# Patient Record
Sex: MALE | Race: WHITE | Employment: PART TIME | URBAN - METROPOLITAN AREA
[De-identification: names, ages, dates, MRNs, and addresses within clinical notes are randomized per-mention and may not be internally consistent; named-entity substitution may affect disease eponyms.]

---

## 2017-01-19 RX ORDER — CARVEDILOL 25 MG/1
TABLET ORAL
Qty: 60 TABLET | Refills: 0 | Status: SHIPPED | OUTPATIENT
Start: 2017-01-19 | End: 2017-02-06

## 2017-01-19 RX ORDER — CARVEDILOL 25 MG/1
TABLET ORAL
Qty: 60 TABLET | Refills: 0 | Status: SHIPPED | OUTPATIENT
Start: 2017-01-19 | End: 2017-02-12

## 2017-01-19 NOTE — TELEPHONE ENCOUNTER
Patient made aware that script refilled.    Patient aware that he must follow up with MD, stated he has appointment set in February

## 2017-01-19 NOTE — TELEPHONE ENCOUNTER
Hypertensive Medications  Protocol Criteria:  · Appointment scheduled in the past 6 months or in the next 3 months  · BMP or CMP in the past 12 months  · Creatinine result < 2  Recent Visits       Provider Department Primary Dx    10 months ago Mao Ramirez

## 2017-01-19 NOTE — TELEPHONE ENCOUNTER
Hypertensive Medications  Protocol Criteria:  · Appointment scheduled in the past 6 months or in the next 3 months  · BMP or CMP in the past 12 months  · Creatinine result < 2  Recent Visits       Provider Department Primary Dx    10 months ago Surinder Hector

## 2017-01-21 NOTE — TELEPHONE ENCOUNTER
Spoke with pt, he called to schedule an apt and was told his insurance changed and he was scheduled to see Dr. Vannessa VILLATORO.

## 2017-02-06 ENCOUNTER — OFFICE VISIT (OUTPATIENT)
Dept: INTERNAL MEDICINE CLINIC | Facility: CLINIC | Age: 62
End: 2017-02-06

## 2017-02-06 VITALS
DIASTOLIC BLOOD PRESSURE: 84 MMHG | HEIGHT: 70.5 IN | BODY MASS INDEX: 30.72 KG/M2 | HEART RATE: 72 BPM | WEIGHT: 217 LBS | TEMPERATURE: 98 F | SYSTOLIC BLOOD PRESSURE: 149 MMHG

## 2017-02-06 DIAGNOSIS — L97.503: Primary | ICD-10-CM

## 2017-02-06 PROCEDURE — 99213 OFFICE O/P EST LOW 20 MIN: CPT | Performed by: INTERNAL MEDICINE

## 2017-02-06 PROCEDURE — 99212 OFFICE O/P EST SF 10 MIN: CPT | Performed by: INTERNAL MEDICINE

## 2017-02-06 RX ORDER — CARVEDILOL 25 MG/1
TABLET ORAL
Qty: 60 TABLET | Refills: 3 | Status: SHIPPED | OUTPATIENT
Start: 2017-02-06 | End: 2017-02-06

## 2017-02-06 RX ORDER — CARVEDILOL 25 MG/1
TABLET ORAL
Qty: 60 TABLET | Refills: 3 | Status: SHIPPED | OUTPATIENT
Start: 2017-02-06 | End: 2017-02-16

## 2017-02-07 NOTE — PROGRESS NOTES
3 months of an ulcer in his left lateral heel  Started when he took a divot out of his foot with a pumice stone  Very painful   02/06/17  1835   BP: 149/84   Pulse: 72   Temp: 98.1 °F (36.7 °C)   Height: 5' 10.5\" (1.791 m)   Weight: 217 lb (98.431 kg)

## 2017-02-13 ENCOUNTER — HOSPITAL ENCOUNTER (OUTPATIENT)
Dept: GENERAL RADIOLOGY | Facility: HOSPITAL | Age: 62
Discharge: HOME | End: 2017-02-13
Attending: INTERNAL MEDICINE
Payer: MEDICAID

## 2017-02-13 DIAGNOSIS — L97.503: ICD-10-CM

## 2017-02-13 PROCEDURE — 73650 X-RAY EXAM OF HEEL: CPT

## 2017-02-14 ENCOUNTER — OFFICE VISIT (OUTPATIENT)
Dept: PODIATRY CLINIC | Facility: CLINIC | Age: 62
End: 2017-02-14

## 2017-02-14 DIAGNOSIS — L97.521 FOOT ULCERATION, LEFT, LIMITED TO BREAKDOWN OF SKIN (HCC): Primary | ICD-10-CM

## 2017-02-14 PROCEDURE — 99212 OFFICE O/P EST SF 10 MIN: CPT | Performed by: PODIATRIST

## 2017-02-14 PROCEDURE — 99243 OFF/OP CNSLTJ NEW/EST LOW 30: CPT | Performed by: PODIATRIST

## 2017-02-14 PROCEDURE — 97597 DBRDMT OPN WND 1ST 20 CM/<: CPT | Performed by: PODIATRIST

## 2017-02-14 RX ORDER — CLINDAMYCIN HYDROCHLORIDE 150 MG/1
150 CAPSULE ORAL 3 TIMES DAILY
Qty: 30 CAPSULE | Refills: 1 | Status: SHIPPED | OUTPATIENT
Start: 2017-02-14 | End: 2017-07-15 | Stop reason: ALTCHOICE

## 2017-02-14 NOTE — PROGRESS NOTES
HPI:    Patient ID: Bercathleen Colbert is a 57year old male. HPI  This pleasant 57-year-old male presents as a new patient to me on consult from [facility]. Primary concern is wound on the lateral aspect of the left heel.   Patient states that it devel per day. As a precaution I placed this patient on clindamycin 150 mg 3 times per day. I reviewed my expectations cautioned him about infection. I plan to reevaluate this patient in 1 week.          ASSESSMENT/PLAN:   Foot ulceration, left, limited to evaristo

## 2017-02-16 RX ORDER — CARVEDILOL 25 MG/1
TABLET ORAL
Qty: 180 TABLET | Refills: 0 | Status: SHIPPED | OUTPATIENT
Start: 2017-02-16 | End: 2017-03-08

## 2017-02-16 NOTE — TELEPHONE ENCOUNTER
Hypertensive Medications  Protocol Criteria:  · Appointment scheduled in the past 6 months or in the next 3 months  · BMP or CMP in the past 12 months  · Creatinine result < 2  Recent Visits       Provider Department Primary Dx    1 week ago Raven Garvey

## 2017-02-16 NOTE — TELEPHONE ENCOUNTER
From: Nathan Chadwick  To:  Lena Lorenzana MD  Sent: 2/15/2017 4:40 AM CST  Subject: Medication Renewal Request    Original authorizing provider: MD Nathan Moralez would like a refill of the following medications:  carvedilol 2

## 2017-02-17 RX ORDER — CARVEDILOL 25 MG/1
TABLET ORAL
Qty: 60 TABLET | Refills: 0 | Status: SHIPPED | OUTPATIENT
Start: 2017-02-17 | End: 2017-03-16

## 2017-02-17 NOTE — TELEPHONE ENCOUNTER
LOV 2/6/2017. Informed pt to have labs done. Verbalized understanding.         Hypertensive Medications  Protocol Criteria:  · Appointment scheduled in the past 6 months or in the next 3 months  · BMP or CMP in the past 12 months  · Creatinine result < 2  R

## 2017-02-24 ENCOUNTER — APPOINTMENT (OUTPATIENT)
Dept: LAB | Age: 62
End: 2017-02-24
Attending: FAMILY MEDICINE
Payer: MEDICAID

## 2017-02-24 ENCOUNTER — OFFICE VISIT (OUTPATIENT)
Dept: PODIATRY CLINIC | Facility: CLINIC | Age: 62
End: 2017-02-24

## 2017-02-24 DIAGNOSIS — L97.521 FOOT ULCERATION, LEFT, LIMITED TO BREAKDOWN OF SKIN (HCC): Primary | ICD-10-CM

## 2017-02-24 PROCEDURE — 84443 ASSAY THYROID STIM HORMONE: CPT | Performed by: FAMILY MEDICINE

## 2017-02-24 PROCEDURE — 97597 DBRDMT OPN WND 1ST 20 CM/<: CPT | Performed by: PODIATRIST

## 2017-02-24 PROCEDURE — 80053 COMPREHEN METABOLIC PANEL: CPT | Performed by: FAMILY MEDICINE

## 2017-02-24 PROCEDURE — 80061 LIPID PANEL: CPT | Performed by: FAMILY MEDICINE

## 2017-02-24 NOTE — PROGRESS NOTES
HPI:    Patient ID: Rochelle Garcia is a 57year old male. HPI  69-year-old male presents for follow-up in reference to the ulceration on the posterior lateral aspect of the toe.   Patient is aware that it is present and believes that it is responding

## 2017-02-27 ENCOUNTER — TELEPHONE (OUTPATIENT)
Dept: FAMILY MEDICINE CLINIC | Facility: CLINIC | Age: 62
End: 2017-02-27

## 2017-02-27 NOTE — TELEPHONE ENCOUNTER
----- Message from Jose Guadalupe Schneider MD sent at 2/24/2017  6:31 PM CST -----  Patient has to follow up  I have not seen him since 3/2016

## 2017-02-27 NOTE — TELEPHONE ENCOUNTER
called patient. No answer left message to return call. IZABELA please schedule patient for follow up appt with Dr. Jonathan Chávez. At MD's request.  See message below.

## 2017-03-08 ENCOUNTER — OFFICE VISIT (OUTPATIENT)
Dept: FAMILY MEDICINE CLINIC | Facility: CLINIC | Age: 62
End: 2017-03-08

## 2017-03-08 ENCOUNTER — OFFICE VISIT (OUTPATIENT)
Dept: PODIATRY CLINIC | Facility: CLINIC | Age: 62
End: 2017-03-08

## 2017-03-08 VITALS
TEMPERATURE: 98 F | WEIGHT: 215 LBS | HEIGHT: 70.5 IN | HEART RATE: 76 BPM | SYSTOLIC BLOOD PRESSURE: 149 MMHG | BODY MASS INDEX: 30.44 KG/M2 | DIASTOLIC BLOOD PRESSURE: 76 MMHG

## 2017-03-08 DIAGNOSIS — E78.00 HYPERCHOLESTEREMIA: ICD-10-CM

## 2017-03-08 DIAGNOSIS — L97.421 ULCER OF LEFT HEEL, LIMITED TO BREAKDOWN OF SKIN (HCC): ICD-10-CM

## 2017-03-08 DIAGNOSIS — I10 ESSENTIAL HYPERTENSION: Primary | ICD-10-CM

## 2017-03-08 DIAGNOSIS — Z12.11 COLON CANCER SCREENING: ICD-10-CM

## 2017-03-08 DIAGNOSIS — K22.9 ABNORMALITY OF ESOPHAGUS: ICD-10-CM

## 2017-03-08 DIAGNOSIS — F17.200 SMOKER: ICD-10-CM

## 2017-03-08 DIAGNOSIS — L97.521 FOOT ULCERATION, LEFT, LIMITED TO BREAKDOWN OF SKIN (HCC): Primary | ICD-10-CM

## 2017-03-08 DIAGNOSIS — R93.89 ABNORMAL CT OF THE CHEST: ICD-10-CM

## 2017-03-08 DIAGNOSIS — G57.02 COMMON PERONEAL NEUROPATHY, LEFT: ICD-10-CM

## 2017-03-08 DIAGNOSIS — R91.1 NODULE OF RIGHT LUNG: ICD-10-CM

## 2017-03-08 PROBLEM — G57.30 COMMON PERONEAL NEUROPATHY: Status: ACTIVE | Noted: 2017-03-08

## 2017-03-08 PROBLEM — L97.429 ULCER OF LEFT HEEL (HCC): Status: ACTIVE | Noted: 2017-03-08

## 2017-03-08 PROCEDURE — 99214 OFFICE O/P EST MOD 30 MIN: CPT | Performed by: FAMILY MEDICINE

## 2017-03-08 PROCEDURE — 99212 OFFICE O/P EST SF 10 MIN: CPT | Performed by: FAMILY MEDICINE

## 2017-03-08 PROCEDURE — 99212 OFFICE O/P EST SF 10 MIN: CPT | Performed by: PODIATRIST

## 2017-03-08 RX ORDER — ATORVASTATIN CALCIUM 20 MG/1
20 TABLET, FILM COATED ORAL NIGHTLY
Qty: 90 TABLET | Refills: 0 | Status: SHIPPED | OUTPATIENT
Start: 2017-03-08 | End: 2017-04-29

## 2017-03-08 RX ORDER — LISINOPRIL 5 MG/1
5 TABLET ORAL DAILY
Qty: 30 TABLET | Refills: 0 | Status: SHIPPED | OUTPATIENT
Start: 2017-03-08 | End: 2017-03-08

## 2017-03-08 NOTE — PROGRESS NOTES
HPI:    Patient ID: Katrin Rueda is a 57year old male. HPI  This 69-year-old male presents for follow-up care in reference to the wound on the posterolateral aspect of the left heel.   Patient bumped it a couple of days ago and it has been rather t

## 2017-03-08 NOTE — PROGRESS NOTES
HPI:    Patient ID: David Fry is a 57year old male. Foot Pain   Associated symptoms include numbness. Blood Pressure  Associated symptoms include numbness.  Pertinent negatives include no abdominal pain, chest pain, chills, coughing, nausea, v (three) times daily. Disp: 30 capsule Rfl: 1     Allergies:No Known Allergies   PHYSICAL EXAM:   Physical Exam   Constitutional: He appears well-developed and well-nourished. Cardiovascular: Normal rate, regular rhythm and normal heart sounds.     Pulmona total) by mouth nightly. lisinopril 5 MG Oral Tab 30 tablet 0      Sig: Take 1 tablet (5 mg total) by mouth daily.            Imaging & Referrals:  GASTRO - INTERNAL  CT CHEST(CONTRAST ONLY) (CPT=71260)  US ABDOMINAL AORTIC ANEURYSM SCREENING (UGH=[id number]

## 2017-03-10 RX ORDER — LISINOPRIL 5 MG/1
TABLET ORAL
Qty: 90 TABLET | Refills: 0 | Status: SHIPPED | OUTPATIENT
Start: 2017-03-10 | End: 2017-04-29

## 2017-03-21 ENCOUNTER — TELEPHONE (OUTPATIENT)
Dept: FAMILY MEDICINE CLINIC | Facility: CLINIC | Age: 62
End: 2017-03-21

## 2017-03-21 DIAGNOSIS — L97.503: Primary | ICD-10-CM

## 2017-03-21 NOTE — TELEPHONE ENCOUNTER
Pt calling to request a referral so he can see Dr. Yelitza Bauer. .. please advise when referral is ready

## 2017-03-22 ENCOUNTER — TELEPHONE (OUTPATIENT)
Dept: FAMILY MEDICINE CLINIC | Facility: CLINIC | Age: 62
End: 2017-03-22

## 2017-03-22 NOTE — TELEPHONE ENCOUNTER
[facility] Radiology requesting 3/8 lab results  Needs prior to CT scheduled for tomorrow 3/23  Fx# [phone]

## 2017-03-23 RX ORDER — CARVEDILOL 25 MG/1
TABLET ORAL
Qty: 60 TABLET | Refills: 2 | Status: SHIPPED | OUTPATIENT
Start: 2017-03-23 | End: 2017-04-29

## 2017-03-23 NOTE — TELEPHONE ENCOUNTER
Hypertensive Medications  Protocol Criteria:  · Appointment scheduled in the past 6 months or in the next 3 months  · BMP or CMP in the past 12 months  · Creatinine result < 2  Recent Visits       Provider Department Primary Dx    2 weeks ago Nakia Santiago,

## 2017-03-23 NOTE — TELEPHONE ENCOUNTER
[place] radiology following up on if pt has recent CT done previously scan please advised and fax results from CT scan over so they can compare. ..

## 2017-03-23 NOTE — TELEPHONE ENCOUNTER
LMTCB. May transfer to [id number] until 4:00 today, and [id number] any time. Need clarification if they need both the labs from 2/24 and also 1/28/2015 Chest CT report which is the last completed. Messages below are not clear.

## 2017-03-24 NOTE — TELEPHONE ENCOUNTER
CT scan of chest result from 5/2/15 faxed to [facility] Radiology to number listed as below.

## 2017-03-29 ENCOUNTER — OFFICE VISIT (OUTPATIENT)
Dept: PODIATRY CLINIC | Facility: CLINIC | Age: 62
End: 2017-03-29

## 2017-03-29 DIAGNOSIS — L97.429 ULCER OF HEEL, LEFT, WITH UNSPECIFIED SEVERITY (HCC): Primary | ICD-10-CM

## 2017-03-29 PROCEDURE — 99212 OFFICE O/P EST SF 10 MIN: CPT | Performed by: PODIATRIST

## 2017-03-29 RX ORDER — ACETAMINOPHEN AND CODEINE PHOSPHATE 300; 30 MG/1; MG/1
TABLET ORAL
Qty: 20 TABLET | Refills: 0 | Status: SHIPPED | OUTPATIENT
Start: 2017-03-29 | End: 2017-04-19

## 2017-03-29 NOTE — PROGRESS NOTES
HPI:    Patient ID: Otilio Nettles is a 57year old male. HPI  This 69-year-old male presents for follow-up in reference to the wound on the posterior lateral aspect of the left heel. Last time I saw this patient was 6 weeks ago.   He is rather on re YO#[id number]

## 2017-04-11 ENCOUNTER — APPOINTMENT (OUTPATIENT)
Dept: WOUND CARE | Facility: HOSPITAL | Age: 62
End: 2017-04-11
Attending: NURSE PRACTITIONER
Payer: MEDICAID

## 2017-04-11 DIAGNOSIS — L97.422 ULCER OF LEFT HEEL, WITH FAT LAYER EXPOSED (HCC): ICD-10-CM

## 2017-04-11 DIAGNOSIS — L97.421 ULCER OF LEFT HEEL, LIMITED TO BREAKDOWN OF SKIN (HCC): Primary | ICD-10-CM

## 2017-04-11 PROCEDURE — 99213 OFFICE O/P EST LOW 20 MIN: CPT

## 2017-04-11 PROCEDURE — 99213 OFFICE O/P EST LOW 20 MIN: CPT | Performed by: CLINICAL NURSE SPECIALIST

## 2017-04-12 ENCOUNTER — HOSPITAL ENCOUNTER (OUTPATIENT)
Dept: ULTRASOUND IMAGING | Facility: HOSPITAL | Age: 62
Discharge: HOME | End: 2017-04-12
Attending: FAMILY MEDICINE
Payer: MEDICAID

## 2017-04-12 ENCOUNTER — TELEPHONE (OUTPATIENT)
Dept: FAMILY MEDICINE CLINIC | Facility: CLINIC | Age: 62
End: 2017-04-12

## 2017-04-12 DIAGNOSIS — E78.00 HYPERCHOLESTEREMIA: ICD-10-CM

## 2017-04-12 DIAGNOSIS — F17.200 SMOKER: ICD-10-CM

## 2017-04-12 DIAGNOSIS — L97.421 ULCER OF LEFT HEEL, LIMITED TO BREAKDOWN OF SKIN (HCC): ICD-10-CM

## 2017-04-12 PROCEDURE — 93923 UPR/LXTR ART STDY 3+ LVLS: CPT

## 2017-04-12 RX ORDER — LIDOCAINE AND PRILOCAINE 25; 25 MG/G; MG/G
1 CREAM TOPICAL 2 TIMES DAILY PRN
Qty: 5 G | Refills: 0 | Status: SHIPPED | OUTPATIENT
Start: 2017-04-12 | End: 2017-10-12 | Stop reason: ALTCHOICE

## 2017-04-14 NOTE — PROGRESS NOTES
Subjective    Chief Complaint  This information was obtained from the patient  The patient is new to the [address] [facility],[address] here for an initial visit for the evaluation and management of non-healing wound(s). Left heel ulcer x 6 months. Wound is not healing.   Pa PHQ2 Depression Screen scale: 0=not at all, 1=several days, 2=more than half the days, 3=nearly every day: 0  Little interest or pleasure in doing things (over the last 2 weeks)?: 0  Feeling down, depressed, or hopeless (over the last 2 weeks)?: 00  Total Wound #1 Left, Lateral Heel is an acute Full Thickness Trauma Wound and has received a status of Not Healed. Initial wound encounter measurements are 0.5cm length x 1.6cm width x 0.3cm depth, with an area of 0.8 sq cm and a volume of 0.24 cubic cm.  No tunn Plan:  Patient to follow up in outpatient wound clinic after studies have been completed in 1 week. Plan    Additional Orders: Follow-Up Appointments  Return Appointment in 1 week.     Wound Cleansing & Dressings  Clean wound with Normal Saline or

## 2017-04-18 ENCOUNTER — NURSE ONLY (OUTPATIENT)
Dept: WOUND CARE | Facility: HOSPITAL | Age: 62
End: 2017-04-18
Attending: NURSE PRACTITIONER
Payer: MEDICAID

## 2017-04-18 DIAGNOSIS — L97.429 ULCER OF LEFT HEEL, WITH UNSPECIFIED SEVERITY (HCC): Primary | ICD-10-CM

## 2017-04-18 PROCEDURE — 99213 OFFICE O/P EST LOW 20 MIN: CPT

## 2017-04-18 NOTE — PROGRESS NOTES
Subjective    Chief Complaint  This information was obtained from the patient  The patient is new to the [address] [facility],[address] here for an initial visit for the evaluation and management of non-healing wound(s). Left heel ulcer x 6 months. Wound is not healing.   Pa The periwound skin texture is normal. The periwound skin moisture is normal. The periwound skin color is normal. The temperature of the periwound skin is WNL. Periwound skin does not exhibit signs or symptoms of infection.         Assessment      Patient st

## 2017-04-19 RX ORDER — ACETAMINOPHEN AND CODEINE PHOSPHATE 300; 30 MG/1; MG/1
TABLET ORAL
Qty: 20 TABLET | Refills: 0 | OUTPATIENT
Start: 2017-04-19 | End: 2017-04-29

## 2017-04-19 NOTE — TELEPHONE ENCOUNTER
Spoke to pt and informed him that T#3 was approved for refill. Verified pharmacy with pt. Pt verbalized understanding.

## 2017-04-19 NOTE — TELEPHONE ENCOUNTER
Spoke to pt and he is requesting T#3. Rates pain 8/10. Pain interferes with sleep. States he has one tablet left of T#3. Taking ibuprofen for pain as well. States he is having vascular surgery and having a stent put in by Dr. Tiffany Landaverde.  Still going to wound cli

## 2017-04-25 ENCOUNTER — HOSPITAL ENCOUNTER (OUTPATIENT)
Dept: ULTRASOUND IMAGING | Facility: HOSPITAL | Age: 62
Discharge: HOME | End: 2017-04-25
Attending: RADIOLOGY
Payer: MEDICAID

## 2017-04-25 ENCOUNTER — NURSE ONLY (OUTPATIENT)
Dept: WOUND CARE | Facility: HOSPITAL | Age: 62
End: 2017-04-25
Attending: NURSE PRACTITIONER
Payer: MEDICAID

## 2017-04-25 ENCOUNTER — TELEPHONE (OUTPATIENT)
Dept: FAMILY MEDICINE CLINIC | Facility: CLINIC | Age: 62
End: 2017-04-25

## 2017-04-25 DIAGNOSIS — R09.89 BRUIT: ICD-10-CM

## 2017-04-25 DIAGNOSIS — L97.429 ULCER OF LEFT HEEL, WITH UNSPECIFIED SEVERITY (HCC): Primary | ICD-10-CM

## 2017-04-25 PROCEDURE — 93880 EXTRACRANIAL BILAT STUDY: CPT

## 2017-04-25 PROCEDURE — 99212 OFFICE O/P EST SF 10 MIN: CPT

## 2017-04-25 NOTE — PROGRESS NOTES
Subjective    Chief Complaint  This information was obtained from the patient  The patient was seen today for follow up and management of difficult to heal wound(s). No concerns.     Allergies  NKA    HPI  This information was obtained from the patient  Sheila Yeung Wound noted with increasing red granuation tissue compared to his last clinic visit. Patient is complaining of a burning sensation from the wound, noted with mild redness and appears inflamed. SALVADOR Hilario saw the patient. No new orders made.  Patient is sc

## 2017-04-25 NOTE — TELEPHONE ENCOUNTER
Carotid us report reviewed  Patient referred to vascular surgery. Continue aspirin 325mg daily.     Patient was called by Leobardo Parnell and informed

## 2017-04-25 NOTE — TELEPHONE ENCOUNTER
Received a call from [facility]. AMA informed of abnormal US. Per AMA pt informed of results and given information for Dr. Elham Barbosa Vascular Surgeon. Advised pt of AMA recommendation to start taking Aspirin 325 mg daily.  Pt. stts he has been ta

## 2017-04-29 PROBLEM — I65.23 BILATERAL CAROTID ARTERY STENOSIS: Status: ACTIVE | Noted: 2017-04-29

## 2017-04-29 NOTE — PROGRESS NOTES
Katrin Rueda is a 57year old male. HPI:   Patient presents for recheck of his hypertension. Pt has been taking medications as instructed, no medication side effects,    Had recent carotid artery US ordered by Dr Lena Maldonado. CONCLUSION:       1.  Hi 180 tablet Rfl: 1   lidocaine-prilocaine 2.5-2.5 % External Cream Apply 1 Application topically 2 (two) times daily as needed. Disp: 5 g Rfl: 0   Clindamycin HCl 150 MG Oral Cap Take 1 capsule (150 mg total) by mouth 3 (three) times daily.  Disp: 30 capsule months    1. Smoker  Patient working on quitting  Plans to quit [birth year] for his birthday    Need CT imaging results from an outside facility    2. Essential hypertension  Stable  cpm    3.  Bilateral carotid artery stenosis  To follow up with Dr Kavita Benítez, needs

## 2017-04-29 NOTE — PATIENT INSTRUCTIONS
Carotid Artery Problems: Blockage     A healthy carotid artery lets blood flow easily to the brain. The blood carries oxygen and nutrients throughout the body. The carotid arteries are large blood vessels that carry blood to the brain.  Certain health p A skin incision is made over the carotid artery. Endarterectomy is the removal of plaque from the carotid artery through an incision in the neck. This surgery has a low risk of stroke or complication (1% to 3%).  It typically involves a quick recovery w © 8606-6443 [address], [address] [place]. All rights reserved. This information is not intended as a substitute for professional medical care. Always follow your healthcare professional's instructions.

## 2017-05-03 ENCOUNTER — NURSE ONLY (OUTPATIENT)
Dept: WOUND CARE | Facility: HOSPITAL | Age: 62
End: 2017-05-03
Attending: NURSE PRACTITIONER
Payer: MEDICAID

## 2017-05-03 ENCOUNTER — HOSPITAL ENCOUNTER (OUTPATIENT)
Dept: CT IMAGING | Facility: HOSPITAL | Age: 62
Discharge: HOME | End: 2017-05-03
Attending: RADIOLOGY
Payer: MEDICAID

## 2017-05-03 DIAGNOSIS — L97.429 ULCER OF LEFT HEEL, WITH UNSPECIFIED SEVERITY (HCC): Primary | ICD-10-CM

## 2017-05-03 DIAGNOSIS — I73.9 PAD (PERIPHERAL ARTERY DISEASE) (HCC): ICD-10-CM

## 2017-05-03 PROCEDURE — 73706 CT ANGIO LWR EXTR W/O&W/DYE: CPT

## 2017-05-03 PROCEDURE — 99213 OFFICE O/P EST LOW 20 MIN: CPT

## 2017-05-03 PROCEDURE — 82565 ASSAY OF CREATININE: CPT

## 2017-05-03 NOTE — PROGRESS NOTES
Chief Complaint  This information was obtained from the patient  The patient was seen today for follow up and management of difficult to heal wound(s). No concerns.     General Notes:  Pt has pain when walking ih the shoe  Allergies  NKA    HPI  This inform The patient was asking for \"numbing cream\" prior to any debridment. The wound still have slough in the base. The wound was debrided by Mary FRANCO. This was not significant enough to charge debridment.  Patient was complaining about pain due to the w patient did not wear his rearfoot offloading shoe. he was wearing his crocs           Debridement Details   Patient Name: Tierra Law  Date: 5/3/2017     RN: Syeda Jerome      Physician / Elham Mallory: Janet Castillo   Patient Number: [id number] Facility:

## 2017-05-10 ENCOUNTER — NURSE ONLY (OUTPATIENT)
Dept: WOUND CARE | Facility: HOSPITAL | Age: 62
End: 2017-05-10
Attending: NURSE PRACTITIONER
Payer: MEDICAID

## 2017-05-10 DIAGNOSIS — L97.429 ULCER OF LEFT HEEL, WITH UNSPECIFIED SEVERITY (HCC): Primary | ICD-10-CM

## 2017-05-10 PROCEDURE — 99213 OFFICE O/P EST LOW 20 MIN: CPT

## 2017-05-10 NOTE — PROGRESS NOTES
Chief Complaint  This information was obtained from the patient  The patient was seen today for follow up and management of difficult to heal wound(s). No concerns.     Allergies  NKA    HPI  This information was obtained from the patient  Patient is a 57 y width x 0.2cm depth, with an area of 1.8 sq cm and a volume of 0.36 cubic cm. There is a small amount of yellow drainage noted which has a mild odor. The patient reports a wound pain of level 6/10. The wound margin is thickened.  Wound bed is 51-75% slough, 7 cm in length. -Popliteal artery proximal segment occlusion, contiguous with distal SFA occlusion. Reconstitution via collateral supply with serial moderate to severe stenoses. -Patent anterior and posterior tibial arteries.  Weakly opacified distal lizeth

## 2017-05-13 NOTE — PROGRESS NOTES
Quick Note:    Called patient c result.  Referral to Dr. Sana Nieto, CV surgery, re: carotid endarterectomy  ______

## 2017-05-13 NOTE — PROGRESS NOTES
Quick Note:    Chronic long segment L SFA and popliteal occlusion. Because of nonhealing L heel ulceration, patient will require revascularization via PTA/stent or surgical bypass. Called patient with result, discussed treatment plan.  Heel ulcer is cli

## 2017-05-16 RX ORDER — HYDROCODONE BITARTRATE AND ACETAMINOPHEN 5; 325 MG/1; MG/1
1 TABLET ORAL EVERY 6 HOURS PRN
Qty: 20 TABLET | Refills: 0 | Status: CANCELLED | OUTPATIENT
Start: 2017-05-16

## 2017-05-16 NOTE — TELEPHONE ENCOUNTER
From: Magnolia Dotson  To: Jamilah Richardson MD  Sent: 5/9/2017 7:22 PM CDT  Subject: Medication Renewal Request    Original authorizing provider: MD Magnolia Kruger would like a refill of the following medications:  HYDROcodone-acetaminop

## 2017-05-16 NOTE — TELEPHONE ENCOUNTER
Called patient  He is very nervous  Pacing room. Happens only at night  Worried about finances. Has cut back on smoking  Would like some alprazolam  Recommend follow up  Would like sat appointment. Next sat in 5/27  pls call to schedule patient.   pedro pablo palmer

## 2017-05-17 ENCOUNTER — NURSE ONLY (OUTPATIENT)
Dept: WOUND CARE | Facility: HOSPITAL | Age: 62
End: 2017-05-17
Attending: NURSE PRACTITIONER
Payer: MEDICAID

## 2017-05-17 DIAGNOSIS — L97.429 ULCER OF LEFT HEEL, WITH UNSPECIFIED SEVERITY (HCC): Primary | ICD-10-CM

## 2017-05-17 PROCEDURE — 97602 WOUND(S) CARE NON-SELECTIVE: CPT

## 2017-05-17 NOTE — PROGRESS NOTES
Chief Complaint  This information was obtained from the patient  The patient was seen today for follow up and management of difficult to heal wound(s). No concerns.     Allergies  NKA    HPI  This information was obtained from the patient  Patient is a 57 y Wound #1 Left, Lateral Heel is an acute Full Thickness Trauma Wound and has received a status of Not Healed. Subsequent wound encounter measurements are 1.2cm length x 1.4cm width x 0.1cm depth, with an area of 1.68 sq cm and a volume of 0.168 cubic cm.  Sandra Gilliland .Wound Treatment Note  Limb cleansed using Soap and water (1)   Cleansed wound and periwound with non-cytotoxic agent.  using Wound Cleanser Spray (1)   Applied enzymatic agent to base of wound bed. using Collagenase Santyl (1)   Applied Primary Wound Dress

## 2017-05-18 NOTE — TELEPHONE ENCOUNTER
Medication refilled  Picked up by patient today at [address] [facility],  [address]

## 2017-05-20 RX ORDER — HYDROCODONE BITARTRATE AND ACETAMINOPHEN 5; 325 MG/1; MG/1
1 TABLET ORAL EVERY 6 HOURS PRN
Qty: 20 TABLET | Refills: 0 | OUTPATIENT
Start: 2017-05-20

## 2017-05-26 ENCOUNTER — NURSE ONLY (OUTPATIENT)
Dept: WOUND CARE | Facility: HOSPITAL | Age: 62
End: 2017-05-26
Attending: NURSE PRACTITIONER
Payer: MEDICAID

## 2017-05-26 DIAGNOSIS — L97.429 ULCER OF LEFT HEEL, WITH UNSPECIFIED SEVERITY (HCC): Primary | ICD-10-CM

## 2017-05-26 PROCEDURE — 97602 WOUND(S) CARE NON-SELECTIVE: CPT

## 2017-05-26 NOTE — PROGRESS NOTES
Chief Complaint  This information was obtained from the patient  The patient was seen today for follow up and management of difficult to heal wound(s). No concerns. General Notes:  No complaints.   Allergies  NKA    HPI  This information was obtained fro wound margin is thickened. Wound bed is 1-25% slough, 1-25% bright red granulation; no epithelialization, , eschar present. The wound is improving.    The periwound skin texture is normal. The periwound skin moisture is normal. The periwound skin color is n Secondary Wound Dressing. using Gauze (sterile) 2x2 (1)   Dressing secured with non-allergenic tape/stockinet/wrap. using Medipore (1)   Offloading  Applied Offloading device.  using 1/4\" thick adhesive felt applied to periwound (2)   Applied offloading sh

## 2017-05-27 PROBLEM — L97.409 HEEL ULCER (HCC): Status: ACTIVE | Noted: 2017-05-27

## 2017-05-27 PROBLEM — F41.0 PANIC ATTACK AS REACTION TO STRESS: Status: ACTIVE | Noted: 2017-05-27

## 2017-05-27 PROBLEM — F43.0 PANIC ATTACK AS REACTION TO STRESS: Status: ACTIVE | Noted: 2017-05-27

## 2017-05-27 NOTE — PROGRESS NOTES
HPI:    Patient ID: Sukumar Renner is a 57year old male. HPI     Patient here for follow up  bps better    Patient has been very stressed about surgery, financial situation.     Lives alone  Wasn't able to work due to heel ulcer for 1 month    Plans Take 1 capsule (150 mg total) by mouth 3 (three) times daily. Disp: 30 capsule Rfl: 1     Allergies:No Known Allergies   PHYSICAL EXAM:   Physical Exam   Constitutional: He appears well-developed and well-nourished.    Cardiovascular: Normal rate, regular r

## 2017-05-31 ENCOUNTER — NURSE ONLY (OUTPATIENT)
Dept: WOUND CARE | Facility: HOSPITAL | Age: 62
End: 2017-05-31
Attending: NURSE PRACTITIONER
Payer: MEDICAID

## 2017-05-31 DIAGNOSIS — L97.429 ULCER OF LEFT HEEL, WITH UNSPECIFIED SEVERITY (HCC): Primary | ICD-10-CM

## 2017-05-31 PROCEDURE — 97602 WOUND(S) CARE NON-SELECTIVE: CPT

## 2017-05-31 NOTE — PROGRESS NOTES
Chief Complaint  This information was obtained from the patient  The patient was seen today for follow up and management of difficult to heal wound(s). No concerns.     General Notes:  no concerns for today  Allergies  NKA    HPI  This information was Noel Bermudez measurements are 1cm length x 1.7cm width x 0.2cm depth, with an area of 1.7 sq cm and a volume of 0.34 cubic cm. There is a small amount of yellow drainage noted which has a mild odor. The patient reports a wound pain of level 6/10.  The wound margin is th Heel)  . Wound Treatment Note  Limb cleansed using Soap and water (1)   Cleansed wound and periwound with non-cytotoxic agent.  using Wound Cleanser Spray (1)   Applied enzymatic agent to base of wound bed. using Collagenase Santyl (1)   Applied Primary Woun

## 2017-06-07 ENCOUNTER — NURSE ONLY (OUTPATIENT)
Dept: WOUND CARE | Facility: HOSPITAL | Age: 62
End: 2017-06-07
Attending: NURSE PRACTITIONER
Payer: MEDICAID

## 2017-06-07 DIAGNOSIS — L97.421 HEEL ULCER, LEFT, LIMITED TO BREAKDOWN OF SKIN (HCC): Primary | ICD-10-CM

## 2017-06-07 PROCEDURE — 97602 WOUND(S) CARE NON-SELECTIVE: CPT

## 2017-06-07 NOTE — PROGRESS NOTES
Chief Complaint  This information was obtained from the patient  The patient was seen today for follow up and management of difficult to heal wound(s). No concerns.     General Notes:  no concerns for today  Allergies  NKA    HPI  This information was Noel Bermudez Wound #1 Left, Lateral Heel is an acute Full Thickness Trauma Wound and has received a status of Not Healed. Subsequent wound encounter measurements are 0.5cm length x 1.9cm width x 0.2cm depth, with an area of 0.95 sq cm and a volume of 0.19 cubic cm.  The corn pad applied

## 2017-06-12 ENCOUNTER — APPOINTMENT (OUTPATIENT)
Dept: WOUND CARE | Facility: HOSPITAL | Age: 62
End: 2017-06-12
Payer: MEDICAID

## 2017-06-13 NOTE — TELEPHONE ENCOUNTER
· Last refilled on 5/17/17 #30 0RF  · Printed script needed.       Recent Visits       Provider Department Primary Dx    2 weeks ago Peng Baumann MD [facility], [address] [facility] [address] Panic attack as reaction to stress    1 month ago Peng Baumann,

## 2017-06-13 NOTE — TELEPHONE ENCOUNTER
From: Josefa Manriquez  To: Beryl Carlos MD  Sent: 6/10/2017 1:31 PM CDT  Subject: Medication Renewal Request    Original authorizing provider: MD Josefa Martni would like a refill of the following medications:  HYDROcodone-acetamino

## 2017-06-14 ENCOUNTER — NURSE ONLY (OUTPATIENT)
Dept: WOUND CARE | Facility: HOSPITAL | Age: 62
End: 2017-06-14
Attending: NURSE PRACTITIONER
Payer: MEDICAID

## 2017-06-14 DIAGNOSIS — L97.429 ULCER OF LEFT HEEL, WITH UNSPECIFIED SEVERITY (HCC): Primary | ICD-10-CM

## 2017-06-14 PROCEDURE — 97602 WOUND(S) CARE NON-SELECTIVE: CPT

## 2017-06-14 NOTE — PROGRESS NOTES
Chief Complaint  This information was obtained from the patient  The patient was seen today for follow up and management of difficult to heal wound(s).  patient has no concerns for today'    Allergies  Penicillins    HPI  This information was obtained from infection.         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

## 2017-06-17 RX ORDER — HYDROCODONE BITARTRATE AND ACETAMINOPHEN 5; 325 MG/1; MG/1
1 TABLET ORAL EVERY 8 HOURS PRN
Qty: 30 TABLET | Refills: 0 | Status: SHIPPED | OUTPATIENT
Start: 2017-06-17 | End: 2017-07-08

## 2017-06-21 ENCOUNTER — NURSE ONLY (OUTPATIENT)
Dept: WOUND CARE | Facility: HOSPITAL | Age: 62
End: 2017-06-21
Attending: NURSE PRACTITIONER
Payer: MEDICAID

## 2017-06-21 DIAGNOSIS — L97.421 HEEL ULCER, LEFT, LIMITED TO BREAKDOWN OF SKIN (HCC): Primary | ICD-10-CM

## 2017-06-21 PROCEDURE — 97602 WOUND(S) CARE NON-SELECTIVE: CPT

## 2017-06-21 RX ORDER — ALPRAZOLAM 0.5 MG/1
0.5 TABLET ORAL NIGHTLY PRN
Qty: 30 TABLET | Refills: 0 | OUTPATIENT
Start: 2017-06-21 | End: 2017-07-08

## 2017-06-21 NOTE — PROGRESS NOTES
Chief Complaint  This information was obtained from the patient  The patient was seen today for follow up and management of difficult to heal wound(s). No concerns.   6/14/17 patient complaint a pain around the wound    General Notes:  no concerns for today status of Not Healed. Subsequent wound encounter measurements are 0.5cm length x 1.6cm width x 0.1cm depth, with an area of 0.8 sq cm and a volume of 0.08 cubic cm. There is a small amount of sero-sanguineous drainage noted which has a mild odor.  The patie Applied enzymatic agent to base of wound bed. using Collagenase Santyl (1)   Applied Primary Wound Dressing. using Other - see notes (1), Xeroform 2x2 (1)   Applied Secondary Wound Dressing.  using Gauze (sterile) 2x2 (1)   Dressing secured with non-aller

## 2017-06-23 RX ORDER — ALPRAZOLAM 0.5 MG/1
0.5 TABLET ORAL NIGHTLY PRN
Qty: 30 TABLET | Refills: 0 | OUTPATIENT
Start: 2017-06-23

## 2017-06-23 NOTE — TELEPHONE ENCOUNTER
From: Baron Barboza  To: Mirela Zimmer MD  Sent: 6/21/2017 4:45 PM CDT  Subject: Medication Renewal Request    Original authorizing provider: MD Baron Jabari Oconnor would like a refill of the following medications:  ALPRAZolam 0.5 MG Ora

## 2017-06-26 RX ORDER — ALPRAZOLAM 0.5 MG/1
TABLET ORAL
Qty: 30 TABLET | Refills: 0 | OUTPATIENT
Start: 2017-06-26

## 2017-06-28 ENCOUNTER — APPOINTMENT (OUTPATIENT)
Dept: WOUND CARE | Facility: HOSPITAL | Age: 62
End: 2017-06-28
Attending: NURSE PRACTITIONER
Payer: MEDICAID

## 2017-06-28 DIAGNOSIS — L97.421 HEEL ULCER, LEFT, LIMITED TO BREAKDOWN OF SKIN (HCC): Primary | ICD-10-CM

## 2017-06-28 PROCEDURE — 97602 WOUND(S) CARE NON-SELECTIVE: CPT

## 2017-06-28 NOTE — PROGRESS NOTES
Subjective    Chief Complaint  This information was obtained from the patient  The patient was seen today for follow up and management of difficult to heal wound(s). No concerns.   6/14/17 patient complaint a pain around the wound    General Notes:  no conc Wound #1 Left, Lateral Heel is an acute Full Thickness Trauma Wound and has received a status of Not Healed. Subsequent wound encounter measurements are 0.6cm length x 1.6cm width x 0.1cm depth, with an area of 0.96 sq cm and a volume of 0.096 cubic cm.  No Follow-Up Appointments:  A follow-up appointment is scheduled next week.           Electronic Signature(s)  Signed By: Date:  Yariel Tucker RN 06/28/2017 2:59:05 PM       Entered By: Yariel Tucker on 06/28/2017 2:58:46 PM   Treatment Notes Summary  Wound

## 2017-07-05 ENCOUNTER — OFFICE VISIT (OUTPATIENT)
Dept: FAMILY MEDICINE CLINIC | Facility: CLINIC | Age: 62
End: 2017-07-05

## 2017-07-05 ENCOUNTER — NURSE ONLY (OUTPATIENT)
Dept: WOUND CARE | Facility: HOSPITAL | Age: 62
End: 2017-07-05
Attending: NURSE PRACTITIONER
Payer: COMMERCIAL

## 2017-07-05 DIAGNOSIS — L97.429 ULCER OF LEFT HEEL, WITH UNSPECIFIED SEVERITY (HCC): Primary | ICD-10-CM

## 2017-07-05 DIAGNOSIS — J32.9 SINUSITIS, UNSPECIFIED CHRONICITY, UNSPECIFIED LOCATION: Primary | ICD-10-CM

## 2017-07-05 PROCEDURE — 97602 WOUND(S) CARE NON-SELECTIVE: CPT

## 2017-07-05 PROCEDURE — 99202 OFFICE O/P NEW SF 15 MIN: CPT | Performed by: NURSE PRACTITIONER

## 2017-07-05 RX ORDER — AMOXICILLIN AND CLAVULANATE POTASSIUM 875; 125 MG/1; MG/1
1 TABLET, FILM COATED ORAL 2 TIMES DAILY
Qty: 20 TABLET | Refills: 0 | Status: SHIPPED | OUTPATIENT
Start: 2017-07-05 | End: 2017-07-15 | Stop reason: ALTCHOICE

## 2017-07-05 NOTE — PROGRESS NOTES
CHIEF COMPLAINT:   Patient presents with:  Sinus Problem      HPI:   Kait Prasad is a 60year old male who presents for sinus congestion, pressure and fullness for 2 weeks. Symptoms are progressing/worsening since onset.  He is using nasocort and muci Smokeless tobacco: Never Used                      Alcohol use: Yes              Comment: Occsionally        REVIEW OF SYSTEMS:   GENERAL:  normal appetite and energy  SKIN: no rashes or abnormal skin lesions  HEENT: See HPI.  No ocular discharge or pruriti The patient is asked to seek further care if symptoms worsen or do not improve. Meds & Refills for this Visit:  Risks, benefits, side effects of medication addressed and explained.       Signed Prescriptions Disp Refills    Amoxicillin-Pot Clavulanate 87 · Over-the-counter decongestants may be used unless a similar medicine was prescribed. Nasal sprays work the fastest. Use one that contains phenylephrine or oxymetazoline. First blow the nose gently. Then use the spray.  Do not use these medicines more ofte © 7784-6212 [address], [address] [place]. All rights reserved. This information is not intended as a substitute for professional medical care. Always follow your healthcare professional's instructions.

## 2017-07-05 NOTE — PROGRESS NOTES
Subjective    Chief Complaint  This information was obtained from the patient  The patient was seen today for follow up and management of difficult to heal wound(s). No concerns.   6/14/17 patient complaint a pain around the wound  7/5/17 no complaints for Wound #1 Left, Lateral Heel is an acute Full Thickness Trauma Wound and has received a status of Not Healed. Subsequent wound encounter measurements are 0.5cm length x 1.6cm width x 0.1cm depth, with an area of 0.8 sq cm and a volume of 0.08 cubic cm.  No t Dressing secured with non-allergenic tape/stockinet/wrap. using Medipore (1)   Offloading  Applied Offloading device.  using 1/4\" thick adhesive felt applied to periwound (2)   Notes  xeroform and saline moist gauze applied over santyl

## 2017-07-11 NOTE — TELEPHONE ENCOUNTER
From: Melba Asencio  Sent: 7/8/2017 11:58 AM CDT  Subject: Medication Renewal Request    Melba Asencio would like a refill of the following medications:  HYDROcodone-acetaminophen 5-325 MG Oral Tab [Aure Estrada MD]  ALPRAZolam 0.5 MG Oral Tab [A

## 2017-07-11 NOTE — TELEPHONE ENCOUNTER
Medication(s) to Refill:   Pending Prescriptions Disp Refills    HYDROcodone-acetaminophen 5-325 MG Oral Tab 30 tablet 0     Sig: Take 1 tablet by mouth every 8 (eight) hours as needed for Pain.       ALPRAZolam 0.5 MG Oral Tab 30 tablet 0     Sig: Take 1 t

## 2017-07-12 ENCOUNTER — NURSE ONLY (OUTPATIENT)
Dept: WOUND CARE | Facility: HOSPITAL | Age: 62
End: 2017-07-12
Attending: NURSE PRACTITIONER
Payer: COMMERCIAL

## 2017-07-12 DIAGNOSIS — L97.429 ULCER OF LEFT HEEL, WITH UNSPECIFIED SEVERITY (HCC): Primary | ICD-10-CM

## 2017-07-12 PROCEDURE — 97602 WOUND(S) CARE NON-SELECTIVE: CPT

## 2017-07-12 RX ORDER — ALPRAZOLAM 0.5 MG/1
0.5 TABLET ORAL NIGHTLY PRN
Qty: 30 TABLET | Refills: 0 | Status: CANCELLED
Start: 2017-07-12

## 2017-07-12 RX ORDER — HYDROCODONE BITARTRATE AND ACETAMINOPHEN 5; 325 MG/1; MG/1
1 TABLET ORAL EVERY 8 HOURS PRN
Qty: 30 TABLET | Refills: 0 | Status: CANCELLED
Start: 2017-07-12

## 2017-07-12 NOTE — TELEPHONE ENCOUNTER
Pt stts he would like a refill on Rx hydrocodone and Rx alprazolam. Pt will be at [facility] tomorrow and would like to pick. Please call when ready for .  Please advise         Current Outpatient Prescriptions:     •  ALPRAZolam 0.5 MG Oral Tab, Take

## 2017-07-12 NOTE — PROGRESS NOTES
Subjective    Chief Complaint  This information was obtained from the patient  The patient was seen today for follow up and management of difficult to heal wound(s). No concerns.   6/14/17 patient complaint a pain around the wound  7/5/17 no complaints for Wound #1 Left, Lateral Heel is an acute Full Thickness Trauma Wound and has received a status of Not Healed. Subsequent wound encounter measurements are 0.3cm length x 1.5cm width x 0.1cm depth, with an area of 0.45 sq cm and a volume of 0.045 cubic cm.  No

## 2017-07-13 RX ORDER — HYDROCODONE BITARTRATE AND ACETAMINOPHEN 5; 325 MG/1; MG/1
1 TABLET ORAL EVERY 8 HOURS PRN
Qty: 30 TABLET | Refills: 0 | Status: SHIPPED | OUTPATIENT
Start: 2017-07-13 | End: 2017-08-16

## 2017-07-13 RX ORDER — ALPRAZOLAM 0.5 MG/1
0.5 TABLET ORAL NIGHTLY PRN
Qty: 30 TABLET | Refills: 0 | Status: SHIPPED | OUTPATIENT
Start: 2017-07-13 | End: 2017-08-16

## 2017-07-15 ENCOUNTER — HOSPITAL ENCOUNTER (OUTPATIENT)
Dept: CT IMAGING | Facility: HOSPITAL | Age: 62
Discharge: HOME | End: 2017-07-15
Attending: PHYSICIAN ASSISTANT
Payer: COMMERCIAL

## 2017-07-15 DIAGNOSIS — I65.23 BILATERAL CAROTID ARTERY STENOSIS: ICD-10-CM

## 2017-07-15 LAB — CREAT BLD-MCNC: 0.7 MG/DL (ref 0.5–1.5)

## 2017-07-15 PROCEDURE — 70498 CT ANGIOGRAPHY NECK: CPT | Performed by: PHYSICIAN ASSISTANT

## 2017-07-15 PROCEDURE — 82565 ASSAY OF CREATININE: CPT

## 2017-07-15 NOTE — PROGRESS NOTES
HPI:    Patient ID: Julieta Lemus is a 60year old male. HPI    Patient here for follow up  Very nervous and anxious  Feels panicky    Cared about the carotid stenosis and surgery and bills  Financial stress    Still smoking  Has cut back.     Contin tablet (25 mg total) by mouth 2 (two) times daily with meals. Disp: 180 tablet Rfl: 1   lidocaine-prilocaine 2.5-2.5 % External Cream Apply 1 Application topically 2 (two) times daily as needed.  Disp: 5 g Rfl: 0     Allergies:No Known Allergies   PHYSICAL

## 2017-07-19 ENCOUNTER — APPOINTMENT (OUTPATIENT)
Dept: WOUND CARE | Facility: HOSPITAL | Age: 62
End: 2017-07-19
Attending: NURSE PRACTITIONER
Payer: COMMERCIAL

## 2017-07-19 DIAGNOSIS — L97.429 ULCER OF LEFT HEEL, WITH UNSPECIFIED SEVERITY (HCC): Primary | ICD-10-CM

## 2017-07-19 PROCEDURE — 97602 WOUND(S) CARE NON-SELECTIVE: CPT

## 2017-07-19 NOTE — PROGRESS NOTES
Subjective    Chief Complaint  This information was obtained from the patient  The patient was seen today for follow up and management of difficult to heal wound(s). No concerns.   6/14/17 patient complaint a pain around the wound  7/5/17 no complaints for Wound #1 Left, Lateral Heel is an acute Full Thickness Trauma Wound and has received a status of Not Healed. Subsequent wound encounter measurements are 0.3cm length x 1cm width x 0.1cm depth, with an area of 0.3 sq cm and a volume of 0.03 cubic cm.  No kiesha Applied enzymatic agent to base of wound bed. using Collagenase Santyl (1)   Applied Primary Wound Dressing. using Xeroform 2x2 (1)   Applied Secondary Wound Dressing.  using Gauze (sterile) 2x2 (1)   Dressing secured with non-allergenic tape/stockinet/wrap

## 2017-07-26 ENCOUNTER — TELEPHONE (OUTPATIENT)
Dept: FAMILY MEDICINE CLINIC | Facility: CLINIC | Age: 62
End: 2017-07-26

## 2017-07-26 ENCOUNTER — NURSE ONLY (OUTPATIENT)
Dept: WOUND CARE | Facility: HOSPITAL | Age: 62
End: 2017-07-26
Attending: NURSE PRACTITIONER
Payer: COMMERCIAL

## 2017-07-26 DIAGNOSIS — L97.429 ULCER OF LEFT HEEL, WITH UNSPECIFIED SEVERITY (HCC): Primary | ICD-10-CM

## 2017-07-26 PROCEDURE — 99212 OFFICE O/P EST SF 10 MIN: CPT

## 2017-07-26 NOTE — PROGRESS NOTES
Subjective    Chief Complaint  This information was obtained from the patient  The patient was seen today for follow up and management of difficult to heal wound(s). No concerns.   6/14/17 patient complaint a pain around the wound  7/26/17 no complaints for Wound #1 Left, Lateral Heel is an acute Full Thickness Trauma Wound and has received a status of Not Healed. Subsequent wound encounter measurements are 0.3cm length x 0.9cm width x 0.1cm depth, with an area of 0.27 sq cm and a volume of 0.027 cubic cm.  No Cleansed wound and periwound with non-cytotoxic agent. using Wound Cleanser Spray (1)   Applied Primary Wound Dressing.  using Hydrofera ready 2x2 (1)   Dressing secured with non-allergenic tape/stockinet/wrap. using Medipore (1)   Offloading  Applied Offlo

## 2017-07-27 NOTE — TELEPHONE ENCOUNTER
PA for Hydrocodone-Acetaminophen 5-325 mg tab completed with EPS via CMM response time 24-72 hours KEY FKGATY.

## 2017-08-02 ENCOUNTER — NURSE ONLY (OUTPATIENT)
Dept: WOUND CARE | Facility: HOSPITAL | Age: 62
End: 2017-08-02
Attending: NURSE PRACTITIONER
Payer: MEDICAID

## 2017-08-02 DIAGNOSIS — L97.429 ULCER OF LEFT HEEL, WITH UNSPECIFIED SEVERITY (HCC): Primary | ICD-10-CM

## 2017-08-02 PROCEDURE — 99212 OFFICE O/P EST SF 10 MIN: CPT

## 2017-08-02 PROCEDURE — 99211 OFF/OP EST MAY X REQ PHY/QHP: CPT | Performed by: NURSE PRACTITIONER

## 2017-08-02 NOTE — PROGRESS NOTES
Chief Complaint  This information was obtained from the patient  The patient was seen today for follow up and management of difficult to heal wound(s). No concerns.   6/14/17 patient complaint a pain around the wound  8/2/17 no complaints for today    Aller Wound #1 Left, Lateral Heel is an acute Full Thickness Trauma Wound and has received a status of Not Healed. Subsequent wound encounter measurements are 0.3cm length x 1cm width with no measurable depth, with an area of 0.3 sq cm .  There is a small amount Entered By: Jaqueline Fenton on 08/02/2017 1:53:22 PM   Treatment Notes Summary  Wound #1 (Left, Lateral Heel)    . Wound Treatment Note  Limb cleansed using Soap and water (1)  Cleansed wound and periwound with non-cytotoxic agent.  using Wound Cleanser Spray

## 2017-08-09 ENCOUNTER — NURSE ONLY (OUTPATIENT)
Dept: WOUND CARE | Facility: HOSPITAL | Age: 62
End: 2017-08-09
Attending: NURSE PRACTITIONER
Payer: MEDICAID

## 2017-08-09 DIAGNOSIS — L97.429 ULCER OF LEFT HEEL, WITH UNSPECIFIED SEVERITY (HCC): Primary | ICD-10-CM

## 2017-08-09 PROCEDURE — 99212 OFFICE O/P EST SF 10 MIN: CPT

## 2017-08-09 NOTE — PROGRESS NOTES
Chief Complaint  This information was obtained from the patient  The patient was seen today for follow up and management of difficult to heal wound(s). No concerns.   6/14/17 patient complaint a pain around the wound  8/9/17 no complaints for today    Aller Wound #1 Left, Lateral Heel is an acute Full Thickness Trauma Wound and has received a status of Not Healed. Subsequent wound encounter measurements are 0.4cm length x 0.8cm width x 0.1cm depth, with an area of 0.32 sq cm and a volume of 0.032 cubic cm.  Charlie Cardenas Slow progressing wound and due to the arterial disease. The wound has become superficial although remains painful to touch.   Patient has some financial issues regarding his insurance and payments, he is postponing the stenting of his lower extremity arteri

## 2017-08-16 ENCOUNTER — OFFICE VISIT (OUTPATIENT)
Dept: FAMILY MEDICINE CLINIC | Facility: CLINIC | Age: 62
End: 2017-08-16

## 2017-08-16 ENCOUNTER — NURSE ONLY (OUTPATIENT)
Dept: WOUND CARE | Facility: HOSPITAL | Age: 62
End: 2017-08-16
Attending: NURSE PRACTITIONER
Payer: MEDICAID

## 2017-08-16 VITALS
HEIGHT: 70.5 IN | DIASTOLIC BLOOD PRESSURE: 81 MMHG | HEART RATE: 77 BPM | TEMPERATURE: 98 F | BODY MASS INDEX: 28.6 KG/M2 | SYSTOLIC BLOOD PRESSURE: 171 MMHG | WEIGHT: 202 LBS

## 2017-08-16 DIAGNOSIS — I10 ESSENTIAL HYPERTENSION: ICD-10-CM

## 2017-08-16 DIAGNOSIS — L97.429 ULCER OF LEFT HEEL, WITH UNSPECIFIED SEVERITY (HCC): ICD-10-CM

## 2017-08-16 DIAGNOSIS — E78.00 HYPERCHOLESTEREMIA: ICD-10-CM

## 2017-08-16 DIAGNOSIS — G89.29 CHRONIC RADICULAR LOW BACK PAIN: Primary | ICD-10-CM

## 2017-08-16 DIAGNOSIS — L97.429 ULCER OF LEFT HEEL, WITH UNSPECIFIED SEVERITY (HCC): Primary | ICD-10-CM

## 2017-08-16 DIAGNOSIS — F43.0 PANIC ATTACK AS REACTION TO STRESS: ICD-10-CM

## 2017-08-16 DIAGNOSIS — F41.0 PANIC ATTACK AS REACTION TO STRESS: ICD-10-CM

## 2017-08-16 DIAGNOSIS — M54.16 CHRONIC RADICULAR LOW BACK PAIN: Primary | ICD-10-CM

## 2017-08-16 PROCEDURE — 99214 OFFICE O/P EST MOD 30 MIN: CPT | Performed by: FAMILY MEDICINE

## 2017-08-16 PROCEDURE — 99212 OFFICE O/P EST SF 10 MIN: CPT

## 2017-08-16 PROCEDURE — 99212 OFFICE O/P EST SF 10 MIN: CPT | Performed by: FAMILY MEDICINE

## 2017-08-16 RX ORDER — HYDROCODONE BITARTRATE AND ACETAMINOPHEN 5; 325 MG/1; MG/1
1 TABLET ORAL EVERY 12 HOURS PRN
Qty: 60 TABLET | Refills: 0 | Status: SHIPPED | OUTPATIENT
Start: 2017-08-16 | End: 2017-10-12

## 2017-08-16 RX ORDER — ALPRAZOLAM 0.5 MG/1
0.5 TABLET ORAL NIGHTLY PRN
Qty: 30 TABLET | Refills: 0 | Status: SHIPPED | OUTPATIENT
Start: 2017-08-16 | End: 2017-09-09

## 2017-08-16 NOTE — PROGRESS NOTES
Chief Complaint  This information was obtained from the patient  The patient was seen today for follow up and management of difficult to heal wound(s). No concerns.   6/14/17 patient complaint a pain around the wound  8/16/17 no complaints for today    Gerald Wound #1 Left, Lateral Heel is an acute Full Thickness Trauma Wound and has received a status of Not Healed. Subsequent wound encounter measurements are 0.3cm length x 0.2cm width x 0.1cm depth, with an area of 0.06 sq cm and a volume of 0.006 cubic cm.  Soraida eHrnandez Christy Galan FNP-BC 08/16/2017 2:18:56 PM       Entered By: Christy Galan on 08/16/2017 2:18:44 PM   Treatment Notes Summary  Wound #1 (Left, Lateral Heel)    . Wound Treatment Note  Limb cleansed using Soap and water (1)  Cleansed wound and periwound with

## 2017-08-17 NOTE — PROGRESS NOTES
HPI:    Patient ID: Sukumar Renner is a 60year old male. HPI     Patient is here for follow-up on his blood pressure and needs lab orders. He is also here for follow-up on the Wellbutrin.   He states he has been taking the medication twice daily and 1 tablet (0.5 mg total) by mouth nightly as needed for Sleep. Disp: 30 tablet Rfl: 0   BuPROPion HCl ER, SR, 150 MG Oral Tablet 12 Hr Take 1 tablet (150 mg total) by mouth 2 (two) times daily.  One pill a day for 3 days and then 2 x dayDiscard fluoxetine or PANEL (14);  Future      Orders Placed This Encounter      Lipid Panel [E]      Comp Metabolic Panel (14) [E]    Meds This Visit:  Signed Prescriptions Disp Refills    HYDROcodone-acetaminophen 5-325 MG Oral Tab 60 tablet 0      Sig: Take 1 tablet by mouth

## 2017-08-23 ENCOUNTER — NURSE ONLY (OUTPATIENT)
Dept: WOUND CARE | Facility: HOSPITAL | Age: 62
End: 2017-08-23
Attending: NURSE PRACTITIONER
Payer: MEDICAID

## 2017-08-23 DIAGNOSIS — L97.429 ULCER OF LEFT HEEL, WITH UNSPECIFIED SEVERITY (HCC): Primary | ICD-10-CM

## 2017-08-23 PROCEDURE — 99212 OFFICE O/P EST SF 10 MIN: CPT

## 2017-08-24 NOTE — PROGRESS NOTES
Chief Complaint  This information was obtained from the patient  The patient was seen today for follow up and management of difficult to heal wound(s). No concerns.   6/14/17 patient complaint a pain around the wound  8/23/17 no complaints for today    Gerald Wound #1 Left, Lateral Heel is an acute Full Thickness Trauma Wound and has received a status of Not Healed. Subsequent wound encounter measurements are 0.2cm length x 0.3cm width x 0.1cm depth, with an area of 0.06 sq cm and a volume of 0.006 cubic cm.  Joanna Coreas Other: - enluxtra  Change dressing every: - week    Follow-Up Appointments:  A follow-up appointment should be scheduled.           Electronic Signature(s)  Signed By: Date:  Tyree PYLE 08/23/2017 3:51:12 PM  Sarah Yadav 08/24/2017 6:55:04 AM

## 2017-08-30 ENCOUNTER — NURSE ONLY (OUTPATIENT)
Dept: WOUND CARE | Facility: HOSPITAL | Age: 62
End: 2017-08-30
Attending: NURSE PRACTITIONER
Payer: MEDICAID

## 2017-08-30 DIAGNOSIS — L97.421 HEEL ULCER, LEFT, LIMITED TO BREAKDOWN OF SKIN (HCC): Primary | ICD-10-CM

## 2017-08-30 PROCEDURE — 99211 OFF/OP EST MAY X REQ PHY/QHP: CPT

## 2017-08-30 NOTE — PROGRESS NOTES
Chief Complaint  This information was obtained from the patient  The patient was seen today for follow up and management of difficult to heal wound(s). No concerns.   6/14/17 patient complaint a pain around the wound  8/23/17 no complaints for today  8/30/2 Entered By: Jana Anderson on 08/30/2017 2:18:39 PM   Signature(s): Date(s):  Treatment Notes Summary  Wound #1 (Left, Lateral Heel)    . Wound Treatment Note  Limb cleansed using Soap and water (1)  Cleansed wound and periwound with non-cytotoxic agent.  us

## 2017-09-06 ENCOUNTER — APPOINTMENT (OUTPATIENT)
Dept: WOUND CARE | Facility: HOSPITAL | Age: 62
End: 2017-09-06
Attending: NURSE PRACTITIONER
Payer: MEDICAID

## 2017-09-12 RX ORDER — ALPRAZOLAM 0.5 MG/1
TABLET ORAL
Qty: 30 TABLET | Refills: 0 | OUTPATIENT
Start: 2017-09-12 | End: 2017-10-07

## 2017-09-12 NOTE — TELEPHONE ENCOUNTER
Last refilled on 8-16-17 #30 0RF  Rx needs to be phoned in if approved.     Refill Protocol Appointment Criteria  · Appointment scheduled in the past 6 months or in the next 3 months  Recent Outpatient Visits            1 week ago Heel ulcer, left, limited

## 2017-09-13 ENCOUNTER — APPOINTMENT (OUTPATIENT)
Dept: WOUND CARE | Facility: HOSPITAL | Age: 62
End: 2017-09-13
Attending: NURSE PRACTITIONER
Payer: MEDICAID

## 2017-09-20 ENCOUNTER — APPOINTMENT (OUTPATIENT)
Dept: WOUND CARE | Facility: HOSPITAL | Age: 62
End: 2017-09-20
Attending: NURSE PRACTITIONER
Payer: MEDICAID

## 2017-09-27 ENCOUNTER — APPOINTMENT (OUTPATIENT)
Dept: WOUND CARE | Facility: HOSPITAL | Age: 62
End: 2017-09-27
Attending: NURSE PRACTITIONER
Payer: MEDICAID

## 2017-10-09 RX ORDER — ALPRAZOLAM 0.5 MG/1
TABLET ORAL
Qty: 30 TABLET | Refills: 0 | OUTPATIENT
Start: 2017-10-09 | End: 2017-11-04

## 2017-10-09 NOTE — TELEPHONE ENCOUNTER
· Please advise on refill. Thanks.   Recent Outpatient Visits            1 month ago Heel ulcer, left, limited to breakdown of skin [facility])    [address] [facility]    Nurse Only    1 month ago Ulcer of left heel, with unspecified severity ([facility] [address]

## 2017-10-12 ENCOUNTER — OFFICE VISIT (OUTPATIENT)
Dept: FAMILY MEDICINE CLINIC | Facility: CLINIC | Age: 62
End: 2017-10-12

## 2017-10-12 VITALS
HEART RATE: 69 BPM | TEMPERATURE: 98 F | SYSTOLIC BLOOD PRESSURE: 162 MMHG | DIASTOLIC BLOOD PRESSURE: 78 MMHG | HEIGHT: 70.5 IN | WEIGHT: 208 LBS | BODY MASS INDEX: 29.45 KG/M2

## 2017-10-12 DIAGNOSIS — L97.401 SKIN ULCER OF HEEL, LIMITED TO BREAKDOWN OF SKIN, UNSPECIFIED LATERALITY (HCC): ICD-10-CM

## 2017-10-12 DIAGNOSIS — I10 ESSENTIAL HYPERTENSION: ICD-10-CM

## 2017-10-12 DIAGNOSIS — Z87.891 SMOKING HISTORY: Primary | ICD-10-CM

## 2017-10-12 DIAGNOSIS — Z23 NEED FOR INFLUENZA VACCINATION: ICD-10-CM

## 2017-10-12 PROCEDURE — 99214 OFFICE O/P EST MOD 30 MIN: CPT | Performed by: FAMILY MEDICINE

## 2017-10-12 PROCEDURE — 90686 IIV4 VACC NO PRSV 0.5 ML IM: CPT | Performed by: FAMILY MEDICINE

## 2017-10-12 PROCEDURE — 90471 IMMUNIZATION ADMIN: CPT | Performed by: FAMILY MEDICINE

## 2017-10-12 RX ORDER — BUPROPION HYDROCHLORIDE 150 MG/1
150 TABLET, EXTENDED RELEASE ORAL 2 TIMES DAILY
Qty: 60 TABLET | Refills: 1 | Status: SHIPPED | OUTPATIENT
Start: 2017-10-12 | End: 2018-01-01

## 2017-10-12 RX ORDER — HYDROCODONE BITARTRATE AND ACETAMINOPHEN 5; 325 MG/1; MG/1
1 TABLET ORAL EVERY 12 HOURS PRN
Qty: 60 TABLET | Refills: 0 | Status: SHIPPED | OUTPATIENT
Start: 2017-10-12 | End: 2018-02-17

## 2017-10-12 NOTE — PROGRESS NOTES
HPI:    Patient ID: Taurus Tony is a 60year old male. HPI     Patient here for smoking cessation  Has been on wellbutrin since 2 1/2 months    Quit smoking 4 days ago    Bps high today    Ate chinese food before he came in today.   Likes salty kendra present. Cardiovascular: Normal rate and regular rhythm. No murmur heard. Pulmonary/Chest: Effort normal and breath sounds normal. He has no wheezes. Musculoskeletal: He exhibits no edema.    Neurological: He is alert and oriented to person, place,

## 2017-11-06 RX ORDER — ALPRAZOLAM 0.5 MG/1
TABLET ORAL
Qty: 30 TABLET | Refills: 3 | OUTPATIENT
Start: 2017-11-08 | End: 2018-03-01

## 2017-11-06 NOTE — TELEPHONE ENCOUNTER
Please advise    Last refilled on 10/9/17    Refill Protocol Appointment Criteria  · Appointment scheduled in the past 6 months or in the next 3 months  Recent Outpatient Visits            3 weeks ago Smoking history    [facility] [place], [facility], [address] [facility] [place]

## 2017-12-06 RX ORDER — LISINOPRIL 5 MG/1
TABLET ORAL
Qty: 30 TABLET | Refills: 3 | Status: SHIPPED | OUTPATIENT
Start: 2017-12-06 | End: 2018-02-17 | Stop reason: DRUGHIGH

## 2017-12-09 ENCOUNTER — APPOINTMENT (OUTPATIENT)
Dept: LAB | Age: 62
End: 2017-12-09
Attending: FAMILY MEDICINE
Payer: MEDICAID

## 2017-12-09 DIAGNOSIS — E78.00 HYPERCHOLESTEREMIA: ICD-10-CM

## 2017-12-09 PROCEDURE — 36415 COLL VENOUS BLD VENIPUNCTURE: CPT

## 2017-12-09 PROCEDURE — 80053 COMPREHEN METABOLIC PANEL: CPT

## 2017-12-09 PROCEDURE — 80061 LIPID PANEL: CPT

## 2018-01-04 RX ORDER — BUPROPION HYDROCHLORIDE 150 MG/1
TABLET, EXTENDED RELEASE ORAL
Qty: 60 TABLET | Refills: 0 | Status: SHIPPED | OUTPATIENT
Start: 2018-01-04 | End: 2018-01-31

## 2018-01-04 NOTE — TELEPHONE ENCOUNTER
Refill Protocol Appointment Criteria  · Appointment scheduled in the past 6 months or in the next 3 months  Recent Outpatient Visits            2 months ago Smoking history    Renee Graff MD    Office Visit    4

## 2018-01-22 RX ORDER — CARVEDILOL 25 MG/1
TABLET ORAL
Qty: 180 TABLET | Refills: 0 | Status: SHIPPED | OUTPATIENT
Start: 2018-01-22

## 2018-02-03 NOTE — TELEPHONE ENCOUNTER
Refill Protocol Appointment Criteria. CSS, please call pt and schedule OV. Per last OV note in 10/2017, pt was to f/u in 1 month. Thanks.         · Appointment scheduled in the past 6 months or in the next 3 months  Recent Outpatient Visits            3 mon

## 2018-02-04 RX ORDER — BUPROPION HYDROCHLORIDE 150 MG/1
TABLET, EXTENDED RELEASE ORAL
Qty: 60 TABLET | Refills: 0 | Status: SHIPPED | OUTPATIENT
Start: 2018-02-04 | End: 2018-03-01

## 2018-02-17 ENCOUNTER — OFFICE VISIT (OUTPATIENT)
Dept: FAMILY MEDICINE CLINIC | Facility: CLINIC | Age: 63
End: 2018-02-17

## 2018-02-17 VITALS
HEIGHT: 70.5 IN | WEIGHT: 211 LBS | SYSTOLIC BLOOD PRESSURE: 140 MMHG | DIASTOLIC BLOOD PRESSURE: 75 MMHG | BODY MASS INDEX: 29.87 KG/M2 | RESPIRATION RATE: 18 BRPM | TEMPERATURE: 98 F | HEART RATE: 69 BPM

## 2018-02-17 DIAGNOSIS — E78.00 HYPERCHOLESTEREMIA: ICD-10-CM

## 2018-02-17 DIAGNOSIS — I10 ESSENTIAL HYPERTENSION: Primary | ICD-10-CM

## 2018-02-17 PROCEDURE — 99213 OFFICE O/P EST LOW 20 MIN: CPT | Performed by: FAMILY MEDICINE

## 2018-02-17 PROCEDURE — 99212 OFFICE O/P EST SF 10 MIN: CPT | Performed by: FAMILY MEDICINE

## 2018-02-17 RX ORDER — LISINOPRIL 10 MG/1
10 TABLET ORAL DAILY
Qty: 90 TABLET | Refills: 0 | Status: SHIPPED | OUTPATIENT
Start: 2018-02-17 | End: 2018-04-22

## 2018-02-17 RX ORDER — HYDROCODONE BITARTRATE AND ACETAMINOPHEN 5; 325 MG/1; MG/1
1 TABLET ORAL EVERY 8 HOURS PRN
Qty: 60 TABLET | Refills: 0 | Status: SHIPPED | OUTPATIENT
Start: 2018-02-17 | End: 2018-04-29

## 2018-02-17 NOTE — PROGRESS NOTES
Stoney Niño is a 60year old male. HPI:   Patient presents for recheck of his hypertension. Pt has been taking medications as instructed, no medication side effects, home BP monitoring in the range of 553-223'G systolic and 46'F diastolic.     Denice Acuna lisinopril 10 MG Oral Tab Take 1 tablet (10 mg total) by mouth daily.  Disp: 90 tablet Rfl: 0   BUPROPION HCL ER, SR, 150 MG Oral Tablet 12 Hr TAKE 1 TABLET(150 MG) BY MOUTH TWICE DAILY Disp: 60 tablet Rfl: 0   CARVEDILOL 25 MG Oral Tab TAKE 1 TABLET BY M atraumatic, normocephalic,ears and throat are clear  NECK: supple,no adenopathy,no bruits  LUNGS: clear to auscultation  CARDIO: RRR without murmur  GI: good BS's,no masses, HSM or tenderness  EXTREMITIES: no cyanosis, clubbing or edema  Ulcer heel healing

## 2018-03-03 RX ORDER — BUPROPION HYDROCHLORIDE 150 MG/1
TABLET, EXTENDED RELEASE ORAL
Qty: 60 TABLET | Refills: 1 | Status: SHIPPED | OUTPATIENT
Start: 2018-03-03 | End: 2018-04-29

## 2018-03-03 RX ORDER — ALPRAZOLAM 0.5 MG/1
TABLET ORAL
Qty: 30 TABLET | Refills: 3 | OUTPATIENT
Start: 2018-03-03 | End: 2018-07-11

## 2018-03-03 NOTE — TELEPHONE ENCOUNTER
Refill Protocol Appointment Criteria  Alprazolam LR 11/8/17 qty30 rf3  Please advise  · Appointment scheduled in the past 6 months or in the next 3 months  Recent Outpatient Visits            2 weeks ago Essential hypertension    [address]

## 2018-03-10 ENCOUNTER — NURSE ONLY (OUTPATIENT)
Dept: FAMILY MEDICINE CLINIC | Facility: CLINIC | Age: 63
End: 2018-03-10

## 2018-03-10 VITALS — DIASTOLIC BLOOD PRESSURE: 82 MMHG | SYSTOLIC BLOOD PRESSURE: 146 MMHG

## 2018-03-10 DIAGNOSIS — I10 ESSENTIAL HYPERTENSION: Primary | ICD-10-CM

## 2018-03-10 PROCEDURE — 99212 OFFICE O/P EST SF 10 MIN: CPT | Performed by: FAMILY MEDICINE

## 2018-03-10 NOTE — PROGRESS NOTES
Patient presents to office for BP check as instructed by Dr. Abelino Dietz on last office visit. Patient's BP taken manually 146/82. Patient reports to be a bit upset was late to appointment due to train, per patient normal BP this morning at home.  Verbal recommend

## 2018-03-10 NOTE — TELEPHONE ENCOUNTER
Patient came in office for [place] bp check stated refill for xanax was approved as shown on his MyChart. Per patient approved on 3/3/18 and pharmacy has not received script. Informed patient I will call in rx to pharmacy, verbalized understanding.  Rx called in

## 2018-04-23 RX ORDER — LISINOPRIL 10 MG/1
TABLET ORAL
Qty: 90 TABLET | Refills: 1 | Status: SHIPPED | OUTPATIENT
Start: 2018-04-23 | End: 2018-12-24

## 2018-04-23 RX ORDER — CARVEDILOL 25 MG/1
TABLET ORAL
Qty: 180 TABLET | Refills: 1 | Status: SHIPPED | OUTPATIENT
Start: 2018-04-23 | End: 2018-12-24

## 2018-04-23 NOTE — TELEPHONE ENCOUNTER
Patient's last visit was on 3/10/18. B/P was 146/82. Rx pended and routed to provider for review.      Hypertensive Medications  Protocol Criteria:  · Appointment scheduled in the past 6 months or in the next 3 months  · BMP or CMP in the past 12 months  ·

## 2018-04-23 NOTE — TELEPHONE ENCOUNTER
Hypertensive Medications  Protocol Criteria:  · Appointment scheduled in the past 6 months or in the next 3 months  · BMP or CMP in the past 12 months  · Creatinine result < 2  Recent Outpatient Visits            1 month ago Essential hypertension    E

## 2018-04-30 RX ORDER — BUPROPION HYDROCHLORIDE 150 MG/1
TABLET, EXTENDED RELEASE ORAL
Qty: 60 TABLET | Refills: 0 | Status: SHIPPED | OUTPATIENT
Start: 2018-04-30 | End: 2018-05-27

## 2018-05-02 RX ORDER — HYDROCODONE BITARTRATE AND ACETAMINOPHEN 5; 325 MG/1; MG/1
1 TABLET ORAL EVERY 8 HOURS PRN
Qty: 60 TABLET | Refills: 0 | Status: SHIPPED | OUTPATIENT
Start: 2018-05-02 | End: 2018-06-23

## 2018-05-02 NOTE — TELEPHONE ENCOUNTER
Patient called and informed Mandeep Marian Rx is ready for  at  [place] location. Patient verbalized understanding with no further questions noted.

## 2018-05-29 RX ORDER — BUPROPION HYDROCHLORIDE 150 MG/1
TABLET, EXTENDED RELEASE ORAL
Qty: 60 TABLET | Refills: 0 | Status: SHIPPED | OUTPATIENT
Start: 2018-05-29 | End: 2018-06-26

## 2018-05-29 NOTE — TELEPHONE ENCOUNTER
Refill Protocol Appointment Criteria  · Appointment scheduled in the past 6 months or in the next 3 months  Recent Outpatient Visits            2 months ago Essential hypertension    [facility], [facility], [address] Only    3 months ago

## 2018-06-16 RX ORDER — ATORVASTATIN CALCIUM 20 MG/1
TABLET, FILM COATED ORAL
Qty: 90 TABLET | Refills: 0 | Status: SHIPPED | OUTPATIENT
Start: 2018-06-16 | End: 2020-01-01

## 2018-06-16 NOTE — TELEPHONE ENCOUNTER
Cholesterol Medications  Protocol Criteria:  · Appointment scheduled in the past 12 months or in the next 3 months  · ALT & LDL on file in the past 12 months  · ALT result < 80  · LDL result <130   Recent Outpatient Visits            3 months ago Essential

## 2018-06-23 NOTE — TELEPHONE ENCOUNTER
From: Tanmay Bhatti  Sent: 6/23/2018 11:29 AM CDT  Subject: Medication Renewal Request    Tanmay Bhatti would like a refill of the following medications:     HYDROcodone-acetaminophen 5-325 MG Oral Tab [Aure Estrada MD]    Preferred pharmacy: [facility]

## 2018-06-25 RX ORDER — HYDROCODONE BITARTRATE AND ACETAMINOPHEN 5; 325 MG/1; MG/1
1 TABLET ORAL EVERY 8 HOURS PRN
Qty: 60 TABLET | Refills: 0 | Status: SHIPPED | OUTPATIENT
Start: 2018-06-25

## 2018-06-27 RX ORDER — BUPROPION HYDROCHLORIDE 150 MG/1
TABLET, EXTENDED RELEASE ORAL
Qty: 60 TABLET | Refills: 0 | Status: SHIPPED | OUTPATIENT
Start: 2018-06-27 | End: 2020-01-01

## 2018-06-27 NOTE — TELEPHONE ENCOUNTER
Refill Protocol Appointment Criteria  · Appointment scheduled in the past 6 months or in the next 3 months  Recent Outpatient Visits            3 months ago Essential hypertension    [address], [address] Only    4 months ago

## 2018-07-12 NOTE — TELEPHONE ENCOUNTER
LOV: 2-17-18 Last Rx: 3-3-18     Please advise in regards to refill request. Thank You    Please respond to pool: BRET DIXONO LPN/SORAYA

## 2018-07-16 RX ORDER — ALPRAZOLAM 0.5 MG/1
TABLET ORAL
Qty: 30 TABLET | Refills: 2 | Status: SHIPPED
Start: 2018-07-16 | End: 2020-01-01 | Stop reason: ALTCHOICE

## 2018-12-24 ENCOUNTER — NURSE TRIAGE (OUTPATIENT)
Dept: OTHER | Age: 63
End: 2018-12-24

## 2018-12-24 ENCOUNTER — HOSPITAL ENCOUNTER (OUTPATIENT)
Age: 63
Discharge: HOME | End: 2018-12-24

## 2018-12-24 ENCOUNTER — TELEPHONE (OUTPATIENT)
Dept: FAMILY MEDICINE CLINIC | Facility: CLINIC | Age: 63
End: 2018-12-24

## 2018-12-24 RX ORDER — CARVEDILOL 25 MG/1
TABLET ORAL
Qty: 180 TABLET | Refills: 0 | Status: CANCELLED | OUTPATIENT
Start: 2018-12-24

## 2018-12-24 RX ORDER — CARVEDILOL 25 MG/1
TABLET ORAL
Qty: 90 TABLET | Refills: 0 | Status: SHIPPED | OUTPATIENT
Start: 2018-12-24 | End: 2018-12-26

## 2018-12-24 RX ORDER — LISINOPRIL 10 MG/1
TABLET ORAL
Qty: 90 TABLET | Refills: 1 | Status: SHIPPED | OUTPATIENT
Start: 2018-12-24

## 2018-12-24 NOTE — TELEPHONE ENCOUNTER
Refill protocol failed, labs and appt out of range.   AMA please advise on refill request.    Hypertensive Medications  Protocol Criteria:  · Appointment scheduled in the past 6 months or in the next 3 months  · BMP or CMP in the past 12 months  · Creatinin

## 2018-12-24 NOTE — TELEPHONE ENCOUNTER
Patient returned call, advised of notes below per Dr Fiorella Vang, patient reports will be setting up his Medicare around March, will be calling back to set up an appt.      Refill for Carvedilol was sent with 90 tabs, currently takes twice daily, should quantity be

## 2018-12-24 NOTE — TELEPHONE ENCOUNTER
Action Requested: Summary for Provider     []  Critical Lab, Recommendations Needed  [] Need Additional Advice  []   FYI    []   Need Orders  [] Need Medications Sent to Pharmacy  []  Other     SUMMARY:   The patient is in need of his blood pressure medica

## 2018-12-26 RX ORDER — CARVEDILOL 25 MG/1
TABLET ORAL
Qty: 180 TABLET | Refills: 0 | Status: SHIPPED | OUTPATIENT
Start: 2018-12-26 | End: 2019-03-23

## 2019-03-25 RX ORDER — CARVEDILOL 25 MG/1
TABLET ORAL
Qty: 180 TABLET | Refills: 0 | OUTPATIENT
Start: 2019-03-25

## 2019-03-25 RX ORDER — CARVEDILOL 25 MG/1
TABLET ORAL
Qty: 60 TABLET | Refills: 0 | Status: SHIPPED | OUTPATIENT
Start: 2019-03-25 | End: 2020-01-01

## 2019-03-25 NOTE — TELEPHONE ENCOUNTER
Patient failed protocol. See TE 3/23/19, needs OV.      Hypertensive Medications  Protocol Criteria:  · Appointment scheduled in the past 6 months or in the next 3 months  · BMP or CMP in the past 12 months  · Creatinine result < 2  Recent Outpatient Visit

## 2019-03-25 NOTE — TELEPHONE ENCOUNTER
To reception staff, pls call pt for appt for CPE. No future appointments.     Hypertensive Medications  Protocol Criteria:  · Appointment scheduled in the past 6 months or in the next 3 months  · BMP or CMP in the past 12 months  · Creatinine result <

## 2019-03-27 NOTE — TELEPHONE ENCOUNTER
A no response letter was sent with the message that an appointment is needed and sent via mail for the patient does not look at [employer].

## 2020-01-01 ENCOUNTER — HOSPITAL ENCOUNTER (INPATIENT)
Facility: HOSPITAL | Age: 65
LOS: 1 days | Discharge: HOME | DRG: 039 | End: 2020-01-01
Attending: THORACIC SURGERY (CARDIOTHORACIC VASCULAR SURGERY) | Admitting: THORACIC SURGERY (CARDIOTHORACIC VASCULAR SURGERY)
Payer: MEDICARE

## 2020-01-01 ENCOUNTER — HOSPITAL ENCOUNTER (OUTPATIENT)
Dept: GENERAL RADIOLOGY | Facility: HOSPITAL | Age: 65
Discharge: HOME | End: 2020-01-01
Attending: THORACIC SURGERY (CARDIOTHORACIC VASCULAR SURGERY)
Payer: MEDICARE

## 2020-01-01 ENCOUNTER — APPOINTMENT (OUTPATIENT)
Dept: GENERAL RADIOLOGY | Facility: HOSPITAL | Age: 65
DRG: 064 | End: 2020-01-01
Attending: EMERGENCY MEDICINE
Payer: MEDICARE

## 2020-01-01 ENCOUNTER — HOSPITAL ENCOUNTER (OUTPATIENT)
Dept: NUCLEAR MEDICINE | Facility: HOSPITAL | Age: 65
Discharge: HOME | End: 2020-01-01
Attending: PHYSICIAN ASSISTANT
Payer: MEDICARE

## 2020-01-01 ENCOUNTER — LAB ENCOUNTER (OUTPATIENT)
Dept: LAB | Facility: HOSPITAL | Age: 65
End: 2020-01-01
Attending: THORACIC SURGERY (CARDIOTHORACIC VASCULAR SURGERY)
Payer: MEDICARE

## 2020-01-01 ENCOUNTER — TELEPHONE (OUTPATIENT)
Dept: PULMONOLOGY | Facility: CLINIC | Age: 65
End: 2020-01-01

## 2020-01-01 ENCOUNTER — ANESTHESIA (OUTPATIENT)
Dept: SURGERY | Facility: HOSPITAL | Age: 65
DRG: 039 | End: 2020-01-01
Payer: MEDICARE

## 2020-01-01 ENCOUNTER — PATIENT OUTREACH (OUTPATIENT)
Dept: CASE MANAGEMENT | Age: 65
End: 2020-01-01

## 2020-01-01 ENCOUNTER — HOSPITAL ENCOUNTER (OUTPATIENT)
Dept: CV DIAGNOSTICS | Facility: HOSPITAL | Age: 65
Discharge: HOME | End: 2020-01-01
Attending: PHYSICIAN ASSISTANT
Payer: MEDICARE

## 2020-01-01 ENCOUNTER — APPOINTMENT (OUTPATIENT)
Dept: GENERAL RADIOLOGY | Facility: HOSPITAL | Age: 65
DRG: 064 | End: 2020-01-01
Attending: INTERNAL MEDICINE
Payer: MEDICARE

## 2020-01-01 ENCOUNTER — HOSPITAL ENCOUNTER (OUTPATIENT)
Dept: INTERVENTIONAL RADIOLOGY/VASCULAR | Facility: HOSPITAL | Age: 65
Discharge: HOME | End: 2020-01-01
Attending: RADIOLOGY | Admitting: RADIOLOGY
Payer: MEDICARE

## 2020-01-01 ENCOUNTER — OFFICE VISIT (OUTPATIENT)
Dept: WOUND CARE | Facility: HOSPITAL | Age: 65
End: 2020-01-01
Attending: RADIOLOGY
Payer: MEDICARE

## 2020-01-01 ENCOUNTER — APPOINTMENT (OUTPATIENT)
Dept: CT IMAGING | Facility: HOSPITAL | Age: 65
DRG: 064 | End: 2020-01-01
Attending: INTERNAL MEDICINE
Payer: MEDICARE

## 2020-01-01 ENCOUNTER — APPOINTMENT (OUTPATIENT)
Dept: CV DIAGNOSTICS | Facility: HOSPITAL | Age: 65
DRG: 064 | End: 2020-01-01
Attending: Other
Payer: MEDICARE

## 2020-01-01 ENCOUNTER — HOSPITAL ENCOUNTER (OUTPATIENT)
Dept: ULTRASOUND IMAGING | Facility: HOSPITAL | Age: 65
Discharge: HOME | End: 2020-01-01
Attending: CLINICAL NURSE SPECIALIST
Payer: MEDICARE

## 2020-01-01 ENCOUNTER — APPOINTMENT (OUTPATIENT)
Dept: GENERAL RADIOLOGY | Facility: HOSPITAL | Age: 65
DRG: 064 | End: 2020-01-01
Attending: HOSPITALIST
Payer: MEDICARE

## 2020-01-01 ENCOUNTER — APPOINTMENT (OUTPATIENT)
Dept: ULTRASOUND IMAGING | Facility: HOSPITAL | Age: 65
DRG: 064 | End: 2020-01-01
Attending: Other
Payer: MEDICARE

## 2020-01-01 ENCOUNTER — HOSPITAL ENCOUNTER (OUTPATIENT)
Dept: CT IMAGING | Facility: HOSPITAL | Age: 65
Discharge: HOME | End: 2020-01-01
Attending: PHYSICIAN ASSISTANT
Payer: MEDICARE

## 2020-01-01 ENCOUNTER — ANESTHESIA EVENT (OUTPATIENT)
Dept: SURGERY | Facility: HOSPITAL | Age: 65
DRG: 039 | End: 2020-01-01
Payer: MEDICARE

## 2020-01-01 ENCOUNTER — TELEMEDICINE (OUTPATIENT)
Dept: NEUROLOGY | Facility: CLINIC | Age: 65
End: 2020-01-01
Payer: MEDICARE

## 2020-01-01 ENCOUNTER — TELEPHONE (OUTPATIENT)
Dept: NEUROLOGY | Facility: CLINIC | Age: 65
End: 2020-01-01

## 2020-01-01 ENCOUNTER — APPOINTMENT (OUTPATIENT)
Dept: CT IMAGING | Facility: HOSPITAL | Age: 65
DRG: 064 | End: 2020-01-01
Attending: EMERGENCY MEDICINE
Payer: MEDICARE

## 2020-01-01 ENCOUNTER — HOSPITAL ENCOUNTER (INPATIENT)
Facility: HOSPITAL | Age: 65
LOS: 11 days | Discharge: HOME | DRG: 064 | End: 2020-01-01
Attending: EMERGENCY MEDICINE | Admitting: EMERGENCY MEDICINE
Payer: MEDICARE

## 2020-01-01 ENCOUNTER — APPOINTMENT (OUTPATIENT)
Dept: WOUND CARE | Facility: HOSPITAL | Age: 65
End: 2020-01-01
Attending: NURSE PRACTITIONER
Payer: MEDICARE

## 2020-01-01 ENCOUNTER — APPOINTMENT (OUTPATIENT)
Dept: MRI IMAGING | Facility: HOSPITAL | Age: 65
DRG: 064 | End: 2020-01-01
Attending: INTERNAL MEDICINE
Payer: MEDICARE

## 2020-01-01 ENCOUNTER — HOSPITAL ENCOUNTER (OUTPATIENT)
Dept: CT IMAGING | Facility: HOSPITAL | Age: 65
Discharge: HOME | End: 2020-01-01
Attending: RADIOLOGY
Payer: MEDICARE

## 2020-01-01 VITALS
RESPIRATION RATE: 21 BRPM | TEMPERATURE: 98 F | WEIGHT: 208 LBS | BODY MASS INDEX: 29.12 KG/M2 | HEIGHT: 71 IN | HEART RATE: 73 BPM | DIASTOLIC BLOOD PRESSURE: 68 MMHG | SYSTOLIC BLOOD PRESSURE: 115 MMHG | OXYGEN SATURATION: 99 %

## 2020-01-01 VITALS
DIASTOLIC BLOOD PRESSURE: 60 MMHG | HEART RATE: 82 BPM | RESPIRATION RATE: 17 BRPM | HEIGHT: 70 IN | OXYGEN SATURATION: 94 % | BODY MASS INDEX: 27.2 KG/M2 | SYSTOLIC BLOOD PRESSURE: 133 MMHG | WEIGHT: 190 LBS

## 2020-01-01 VITALS
SYSTOLIC BLOOD PRESSURE: 130 MMHG | HEIGHT: 71 IN | DIASTOLIC BLOOD PRESSURE: 71 MMHG | HEART RATE: 57 BPM | OXYGEN SATURATION: 93 % | RESPIRATION RATE: 16 BRPM | WEIGHT: 173.31 LBS | TEMPERATURE: 98 F | BODY MASS INDEX: 24.26 KG/M2

## 2020-01-01 DIAGNOSIS — I62.9 INTRACRANIAL HEMORRHAGE (HCC): ICD-10-CM

## 2020-01-01 DIAGNOSIS — R56.9 SEIZURE (HCC): Primary | ICD-10-CM

## 2020-01-01 DIAGNOSIS — I73.9 PAD (PERIPHERAL ARTERY DISEASE) (HCC): Primary | ICD-10-CM

## 2020-01-01 DIAGNOSIS — S91.302A OPEN WOUND OF LEFT FOOT, INITIAL ENCOUNTER: Primary | ICD-10-CM

## 2020-01-01 DIAGNOSIS — I65.23 BILATERAL CAROTID ARTERY STENOSIS: ICD-10-CM

## 2020-01-01 DIAGNOSIS — I73.9 PAD (PERIPHERAL ARTERY DISEASE) (HCC): ICD-10-CM

## 2020-01-01 DIAGNOSIS — I65.23 OCCLUSION AND STENOSIS OF BILATERAL CAROTID ARTERIES: ICD-10-CM

## 2020-01-01 DIAGNOSIS — I65.23 BILATERAL CAROTID ARTERY STENOSIS: Primary | ICD-10-CM

## 2020-01-01 DIAGNOSIS — S91.302A NON HEALING LEFT HEEL WOUND: ICD-10-CM

## 2020-01-01 DIAGNOSIS — Z01.810 PRE-OPERATIVE CARDIOVASCULAR EXAMINATION: ICD-10-CM

## 2020-01-01 DIAGNOSIS — I20.0 UNSTABLE ANGINA PECTORIS (HCC): ICD-10-CM

## 2020-01-01 LAB
ADENOVIRUS PCR:: NEGATIVE
ALBUMIN SERPL-MCNC: 3.8 G/DL (ref 3.4–5)
ALBUMIN/GLOB SERPL: 1.1 {RATIO} (ref 1–2)
ALP LIVER SERPL-CCNC: 64 U/L (ref 45–117)
ALT SERPL-CCNC: 23 U/L (ref 16–61)
ANION GAP SERPL CALC-SCNC: 13 MMOL/L (ref 0–18)
ANION GAP SERPL CALC-SCNC: 5 MMOL/L (ref 0–18)
ANION GAP SERPL CALC-SCNC: 6 MMOL/L (ref 0–18)
ANION GAP SERPL CALC-SCNC: 6 MMOL/L (ref 0–18)
ANION GAP SERPL CALC-SCNC: 7 MMOL/L (ref 0–18)
ANION GAP SERPL CALC-SCNC: 9 MMOL/L (ref 0–18)
ANION GAP SERPL CALC-SCNC: 9 MMOL/L (ref 0–18)
ANTIBODY SCREEN: NEGATIVE
ANTIBODY SCREEN: NEGATIVE
AST SERPL-CCNC: 17 U/L (ref 15–37)
B PERT DNA SPEC QL NAA+PROBE: NEGATIVE
BASOPHILS # BLD AUTO: 0.05 X10(3) UL (ref 0–0.2)
BASOPHILS # BLD AUTO: 0.06 X10(3) UL (ref 0–0.2)
BASOPHILS # BLD AUTO: 0.06 X10(3) UL (ref 0–0.2)
BASOPHILS # BLD AUTO: 0.07 X10(3) UL (ref 0–0.2)
BASOPHILS # BLD AUTO: 0.08 X10(3) UL (ref 0–0.2)
BASOPHILS NFR BLD AUTO: 0.4 %
BASOPHILS NFR BLD AUTO: 0.5 %
BASOPHILS NFR BLD AUTO: 0.6 %
BASOPHILS NFR BLD AUTO: 0.6 %
BASOPHILS NFR BLD AUTO: 0.7 %
BASOPHILS NFR BLD AUTO: 0.8 %
BILIRUB SERPL-MCNC: 0.3 MG/DL (ref 0.1–2)
BILIRUB UR QL: NEGATIVE
BUN BLD-MCNC: 10 MG/DL (ref 7–18)
BUN BLD-MCNC: 10 MG/DL (ref 7–18)
BUN BLD-MCNC: 11 MG/DL (ref 7–18)
BUN BLD-MCNC: 11 MG/DL (ref 7–18)
BUN BLD-MCNC: 13 MG/DL (ref 7–18)
BUN BLD-MCNC: 14 MG/DL (ref 7–18)
BUN BLD-MCNC: 7 MG/DL (ref 7–18)
BUN BLD-MCNC: 8 MG/DL (ref 7–18)
BUN/CREAT SERPL: 10.5 (ref 10–20)
BUN/CREAT SERPL: 11.1 (ref 10–20)
BUN/CREAT SERPL: 15.7 (ref 10–20)
BUN/CREAT SERPL: 16.4 (ref 10–20)
BUN/CREAT SERPL: 20 (ref 10–20)
BUN/CREAT SERPL: 22.2 (ref 10–20)
BUN/CREAT SERPL: 22.6 (ref 10–20)
BUN/CREAT SERPL: 23.3 (ref 10–20)
BUN/CREAT SERPL: 26 (ref 10–20)
BUN/CREAT SERPL: 9.1 (ref 10–20)
C PNEUM DNA SPEC QL NAA+PROBE: NEGATIVE
CALCIUM BLD-MCNC: 8.1 MG/DL (ref 8.5–10.1)
CALCIUM BLD-MCNC: 8.3 MG/DL (ref 8.5–10.1)
CALCIUM BLD-MCNC: 8.4 MG/DL (ref 8.5–10.1)
CALCIUM BLD-MCNC: 8.5 MG/DL (ref 8.5–10.1)
CALCIUM BLD-MCNC: 8.5 MG/DL (ref 8.5–10.1)
CALCIUM BLD-MCNC: 8.6 MG/DL (ref 8.5–10.1)
CALCIUM BLD-MCNC: 8.6 MG/DL (ref 8.5–10.1)
CALCIUM BLD-MCNC: 9 MG/DL (ref 8.5–10.1)
CALCIUM BLD-MCNC: 9.2 MG/DL (ref 8.5–10.1)
CALCIUM BLD-MCNC: 9.2 MG/DL (ref 8.5–10.1)
CHLORIDE SERPL-SCNC: 105 MMOL/L (ref 98–112)
CHLORIDE SERPL-SCNC: 106 MMOL/L (ref 98–112)
CHLORIDE SERPL-SCNC: 107 MMOL/L (ref 98–112)
CHLORIDE SERPL-SCNC: 107 MMOL/L (ref 98–112)
CHLORIDE SERPL-SCNC: 108 MMOL/L (ref 98–112)
CHLORIDE SERPL-SCNC: 108 MMOL/L (ref 98–112)
CHLORIDE SERPL-SCNC: 109 MMOL/L (ref 98–112)
CHLORIDE SERPL-SCNC: 110 MMOL/L (ref 98–112)
CHOLEST SMN-MCNC: 151 MG/DL (ref ?–200)
CLARITY UR: CLEAR
CO2 SERPL-SCNC: 18 MMOL/L (ref 21–32)
CO2 SERPL-SCNC: 25 MMOL/L (ref 21–32)
CO2 SERPL-SCNC: 26 MMOL/L (ref 21–32)
CO2 SERPL-SCNC: 27 MMOL/L (ref 21–32)
CO2 SERPL-SCNC: 28 MMOL/L (ref 21–32)
CO2 SERPL-SCNC: 28 MMOL/L (ref 21–32)
CO2 SERPL-SCNC: 29 MMOL/L (ref 21–32)
CO2 SERPL-SCNC: 30 MMOL/L (ref 21–32)
COLOR UR: YELLOW
CORONAVIRUS 229E PCR:: NEGATIVE
CORONAVIRUS HKU1 PCR:: NEGATIVE
CORONAVIRUS NL63 PCR:: NEGATIVE
CORONAVIRUS OC43 PCR:: NEGATIVE
CREAT BLD-MCNC: 0.5 MG/DL (ref 0.7–1.3)
CREAT BLD-MCNC: 0.5 MG/DL (ref 0.7–1.3)
CREAT BLD-MCNC: 0.6 MG/DL (ref 0.7–1.3)
CREAT BLD-MCNC: 0.61 MG/DL (ref 0.7–1.3)
CREAT BLD-MCNC: 0.62 MG/DL (ref 0.7–1.3)
CREAT BLD-MCNC: 0.63 MG/DL (ref 0.7–1.3)
CREAT BLD-MCNC: 0.7 MG/DL (ref 0.7–1.3)
CREAT BLD-MCNC: 0.72 MG/DL (ref 0.7–1.3)
CREAT BLD-MCNC: 0.77 MG/DL (ref 0.7–1.3)
CREAT BLD-MCNC: 0.8 MG/DL (ref 0.7–1.3)
CREAT BLD-MCNC: 1.05 MG/DL (ref 0.7–1.3)
DEPRECATED RDW RBC AUTO: 47 FL (ref 35.1–46.3)
DEPRECATED RDW RBC AUTO: 48.6 FL (ref 35.1–46.3)
DEPRECATED RDW RBC AUTO: 48.8 FL (ref 35.1–46.3)
DEPRECATED RDW RBC AUTO: 49 FL (ref 35.1–46.3)
DEPRECATED RDW RBC AUTO: 50.1 FL (ref 35.1–46.3)
DEPRECATED RDW RBC AUTO: 50.4 FL (ref 35.1–46.3)
DEPRECATED RDW RBC AUTO: 50.9 FL (ref 35.1–46.3)
DEPRECATED RDW RBC AUTO: 51.4 FL (ref 35.1–46.3)
DEPRECATED RDW RBC AUTO: 51.5 FL (ref 35.1–46.3)
DEPRECATED RDW RBC AUTO: 51.7 FL (ref 35.1–46.3)
EOSINOPHIL # BLD AUTO: 0.02 X10(3) UL (ref 0–0.7)
EOSINOPHIL # BLD AUTO: 0.05 X10(3) UL (ref 0–0.7)
EOSINOPHIL # BLD AUTO: 0.09 X10(3) UL (ref 0–0.7)
EOSINOPHIL # BLD AUTO: 0.21 X10(3) UL (ref 0–0.7)
EOSINOPHIL # BLD AUTO: 0.23 X10(3) UL (ref 0–0.7)
EOSINOPHIL # BLD AUTO: 0.25 X10(3) UL (ref 0–0.7)
EOSINOPHIL # BLD AUTO: 0.26 X10(3) UL (ref 0–0.7)
EOSINOPHIL # BLD AUTO: 0.28 X10(3) UL (ref 0–0.7)
EOSINOPHIL # BLD AUTO: 0.3 X10(3) UL (ref 0–0.7)
EOSINOPHIL NFR BLD AUTO: 0.1 %
EOSINOPHIL NFR BLD AUTO: 0.3 %
EOSINOPHIL NFR BLD AUTO: 0.5 %
EOSINOPHIL NFR BLD AUTO: 1.8 %
EOSINOPHIL NFR BLD AUTO: 1.9 %
EOSINOPHIL NFR BLD AUTO: 2 %
EOSINOPHIL NFR BLD AUTO: 2.2 %
EOSINOPHIL NFR BLD AUTO: 2.6 %
EOSINOPHIL NFR BLD AUTO: 2.7 %
ERYTHROCYTE [DISTWIDTH] IN BLOOD BY AUTOMATED COUNT: 13.8 % (ref 11–15)
ERYTHROCYTE [DISTWIDTH] IN BLOOD BY AUTOMATED COUNT: 14 % (ref 11–15)
ERYTHROCYTE [DISTWIDTH] IN BLOOD BY AUTOMATED COUNT: 14 % (ref 11–15)
ERYTHROCYTE [DISTWIDTH] IN BLOOD BY AUTOMATED COUNT: 14.3 % (ref 11–15)
ERYTHROCYTE [DISTWIDTH] IN BLOOD BY AUTOMATED COUNT: 14.4 % (ref 11–15)
ERYTHROCYTE [DISTWIDTH] IN BLOOD BY AUTOMATED COUNT: 14.5 % (ref 11–15)
ERYTHROCYTE [DISTWIDTH] IN BLOOD BY AUTOMATED COUNT: 14.6 % (ref 11–15)
ERYTHROCYTE [DISTWIDTH] IN BLOOD BY AUTOMATED COUNT: 14.6 % (ref 11–15)
ERYTHROCYTE [DISTWIDTH] IN BLOOD BY AUTOMATED COUNT: 14.7 % (ref 11–15)
ERYTHROCYTE [DISTWIDTH] IN BLOOD BY AUTOMATED COUNT: 14.8 % (ref 11–15)
FLUAV RNA SPEC QL NAA+PROBE: NEGATIVE
FLUBV RNA SPEC QL NAA+PROBE: NEGATIVE
GLOBULIN PLAS-MCNC: 3.5 G/DL (ref 2.8–4.4)
GLUCOSE BLD-MCNC: 114 MG/DL (ref 70–99)
GLUCOSE BLD-MCNC: 172 MG/DL (ref 70–99)
GLUCOSE BLD-MCNC: 81 MG/DL (ref 70–99)
GLUCOSE BLD-MCNC: 83 MG/DL (ref 70–99)
GLUCOSE BLD-MCNC: 88 MG/DL (ref 70–99)
GLUCOSE BLD-MCNC: 91 MG/DL (ref 70–99)
GLUCOSE BLD-MCNC: 92 MG/DL (ref 70–99)
GLUCOSE BLD-MCNC: 92 MG/DL (ref 70–99)
GLUCOSE BLD-MCNC: 94 MG/DL (ref 70–99)
GLUCOSE BLD-MCNC: 96 MG/DL (ref 70–99)
GLUCOSE BLDC GLUCOMTR-MCNC: 100 MG/DL (ref 70–99)
GLUCOSE BLDC GLUCOMTR-MCNC: 109 MG/DL (ref 70–99)
GLUCOSE BLDC GLUCOMTR-MCNC: 112 MG/DL (ref 70–99)
GLUCOSE BLDC GLUCOMTR-MCNC: 117 MG/DL (ref 70–99)
GLUCOSE BLDC GLUCOMTR-MCNC: 201 MG/DL (ref 70–99)
GLUCOSE BLDC GLUCOMTR-MCNC: 80 MG/DL (ref 70–99)
GLUCOSE BLDC GLUCOMTR-MCNC: 84 MG/DL (ref 70–99)
GLUCOSE BLDC GLUCOMTR-MCNC: 86 MG/DL (ref 70–99)
GLUCOSE BLDC GLUCOMTR-MCNC: 89 MG/DL (ref 70–99)
GLUCOSE BLDC GLUCOMTR-MCNC: 92 MG/DL (ref 70–99)
GLUCOSE BLDC GLUCOMTR-MCNC: 94 MG/DL (ref 70–99)
GLUCOSE UR-MCNC: NEGATIVE MG/DL
HAV IGM SER QL: 1.7 MG/DL (ref 1.6–2.6)
HAV IGM SER QL: 1.9 MG/DL (ref 1.6–2.6)
HAV IGM SER QL: 1.9 MG/DL (ref 1.6–2.6)
HCT VFR BLD AUTO: 37.1 % (ref 39–53)
HCT VFR BLD AUTO: 37.2 % (ref 39–53)
HCT VFR BLD AUTO: 37.7 % (ref 39–53)
HCT VFR BLD AUTO: 38.3 % (ref 39–53)
HCT VFR BLD AUTO: 39.1 % (ref 39–53)
HCT VFR BLD AUTO: 40.4 % (ref 39–53)
HCT VFR BLD AUTO: 40.7 % (ref 39–53)
HCT VFR BLD AUTO: 41.8 % (ref 39–53)
HCT VFR BLD AUTO: 42.3 % (ref 39–53)
HCT VFR BLD AUTO: 43.2 % (ref 39–53)
HDLC SERPL-MCNC: 38 MG/DL (ref 40–59)
HGB BLD-MCNC: 12.4 G/DL (ref 13–17.5)
HGB BLD-MCNC: 12.5 G/DL (ref 13–17.5)
HGB BLD-MCNC: 12.5 G/DL (ref 13–17.5)
HGB BLD-MCNC: 12.6 G/DL (ref 13–17.5)
HGB BLD-MCNC: 12.9 G/DL (ref 13–17.5)
HGB BLD-MCNC: 13.3 G/DL (ref 13–17.5)
HGB BLD-MCNC: 13.4 G/DL (ref 13–17.5)
HGB BLD-MCNC: 13.8 G/DL (ref 13–17.5)
HGB BLD-MCNC: 13.8 G/DL (ref 13–17.5)
HGB BLD-MCNC: 14 G/DL (ref 13–17.5)
HGB UR QL STRIP.AUTO: NEGATIVE
IMM GRANULOCYTES # BLD AUTO: 0.04 X10(3) UL (ref 0–1)
IMM GRANULOCYTES # BLD AUTO: 0.05 X10(3) UL (ref 0–1)
IMM GRANULOCYTES # BLD AUTO: 0.05 X10(3) UL (ref 0–1)
IMM GRANULOCYTES # BLD AUTO: 0.06 X10(3) UL (ref 0–1)
IMM GRANULOCYTES # BLD AUTO: 0.08 X10(3) UL (ref 0–1)
IMM GRANULOCYTES # BLD AUTO: 0.09 X10(3) UL (ref 0–1)
IMM GRANULOCYTES # BLD AUTO: 0.1 X10(3) UL (ref 0–1)
IMM GRANULOCYTES # BLD AUTO: 0.1 X10(3) UL (ref 0–1)
IMM GRANULOCYTES # BLD AUTO: 0.13 X10(3) UL (ref 0–1)
IMM GRANULOCYTES NFR BLD: 0.4 %
IMM GRANULOCYTES NFR BLD: 0.5 %
IMM GRANULOCYTES NFR BLD: 0.7 %
IMM GRANULOCYTES NFR BLD: 0.7 %
IMM GRANULOCYTES NFR BLD: 1.1 %
ISTAT ACTIVATED CLOTTING TIME: 224 SECONDS (ref 125–137)
KETONES UR-MCNC: NEGATIVE MG/DL
LDLC SERPL CALC-MCNC: 88 MG/DL (ref ?–100)
LEUKOCYTE ESTERASE UR QL STRIP.AUTO: NEGATIVE
LYMPHOCYTES # BLD AUTO: 1.01 X10(3) UL (ref 1–4)
LYMPHOCYTES # BLD AUTO: 1.01 X10(3) UL (ref 1–4)
LYMPHOCYTES # BLD AUTO: 1.04 X10(3) UL (ref 1–4)
LYMPHOCYTES # BLD AUTO: 1.06 X10(3) UL (ref 1–4)
LYMPHOCYTES # BLD AUTO: 1.09 X10(3) UL (ref 1–4)
LYMPHOCYTES # BLD AUTO: 1.26 X10(3) UL (ref 1–4)
LYMPHOCYTES # BLD AUTO: 1.35 X10(3) UL (ref 1–4)
LYMPHOCYTES # BLD AUTO: 1.6 X10(3) UL (ref 1–4)
LYMPHOCYTES # BLD AUTO: 1.87 X10(3) UL (ref 1–4)
LYMPHOCYTES NFR BLD AUTO: 10.1 %
LYMPHOCYTES NFR BLD AUTO: 11.5 %
LYMPHOCYTES NFR BLD AUTO: 15.5 %
LYMPHOCYTES NFR BLD AUTO: 18 %
LYMPHOCYTES NFR BLD AUTO: 5.9 %
LYMPHOCYTES NFR BLD AUTO: 6.5 %
LYMPHOCYTES NFR BLD AUTO: 6.5 %
LYMPHOCYTES NFR BLD AUTO: 6.7 %
LYMPHOCYTES NFR BLD AUTO: 9 %
M PROTEIN MFR SERPL ELPH: 7.3 G/DL (ref 6.4–8.2)
MCH RBC QN AUTO: 30.5 PG (ref 26–34)
MCH RBC QN AUTO: 30.6 PG (ref 26–34)
MCH RBC QN AUTO: 30.9 PG (ref 26–34)
MCH RBC QN AUTO: 31.2 PG (ref 26–34)
MCH RBC QN AUTO: 31.4 PG (ref 26–34)
MCH RBC QN AUTO: 31.7 PG (ref 26–34)
MCHC RBC AUTO-ENTMCNC: 32.4 G/DL (ref 31–37)
MCHC RBC AUTO-ENTMCNC: 32.6 G/DL (ref 31–37)
MCHC RBC AUTO-ENTMCNC: 32.6 G/DL (ref 31–37)
MCHC RBC AUTO-ENTMCNC: 32.7 G/DL (ref 31–37)
MCHC RBC AUTO-ENTMCNC: 33 G/DL (ref 31–37)
MCHC RBC AUTO-ENTMCNC: 33 G/DL (ref 31–37)
MCHC RBC AUTO-ENTMCNC: 33.2 G/DL (ref 31–37)
MCHC RBC AUTO-ENTMCNC: 33.4 G/DL (ref 31–37)
MCHC RBC AUTO-ENTMCNC: 33.4 G/DL (ref 31–37)
MCHC RBC AUTO-ENTMCNC: 33.6 G/DL (ref 31–37)
MCV RBC AUTO: 92.1 FL (ref 80–100)
MCV RBC AUTO: 93.4 FL (ref 80–100)
MCV RBC AUTO: 93.5 FL (ref 80–100)
MCV RBC AUTO: 94 FL (ref 80–100)
MCV RBC AUTO: 94 FL (ref 80–100)
MCV RBC AUTO: 94.3 FL (ref 80–100)
MCV RBC AUTO: 94.4 FL (ref 80–100)
MCV RBC AUTO: 94.6 FL (ref 80–100)
MCV RBC AUTO: 94.7 FL (ref 80–100)
MCV RBC AUTO: 95.9 FL (ref 80–100)
METAPNEUMOVIRUS PCR:: NEGATIVE
MONOCYTES # BLD AUTO: 0.61 X10(3) UL (ref 0.1–1)
MONOCYTES # BLD AUTO: 0.71 X10(3) UL (ref 0.1–1)
MONOCYTES # BLD AUTO: 0.75 X10(3) UL (ref 0.1–1)
MONOCYTES # BLD AUTO: 0.76 X10(3) UL (ref 0.1–1)
MONOCYTES # BLD AUTO: 0.88 X10(3) UL (ref 0.1–1)
MONOCYTES # BLD AUTO: 0.99 X10(3) UL (ref 0.1–1)
MONOCYTES # BLD AUTO: 1.01 X10(3) UL (ref 0.1–1)
MONOCYTES # BLD AUTO: 1.11 X10(3) UL (ref 0.1–1)
MONOCYTES # BLD AUTO: 1.23 X10(3) UL (ref 0.1–1)
MONOCYTES NFR BLD AUTO: 10.1 %
MONOCYTES NFR BLD AUTO: 4.4 %
MONOCYTES NFR BLD AUTO: 5.9 %
MONOCYTES NFR BLD AUTO: 6.4 %
MONOCYTES NFR BLD AUTO: 6.4 %
MONOCYTES NFR BLD AUTO: 6.6 %
MONOCYTES NFR BLD AUTO: 6.6 %
MONOCYTES NFR BLD AUTO: 6.9 %
MONOCYTES NFR BLD AUTO: 7.3 %
MRSA DNA SPEC QL NAA+PROBE: NEGATIVE
MRSA DNA SPEC QL NAA+PROBE: NEGATIVE
MYCOPLASMA PNEUMONIA PCR:: NEGATIVE
NEUTROPHILS # BLD AUTO: 10.85 X10 (3) UL (ref 1.5–7.7)
NEUTROPHILS # BLD AUTO: 10.85 X10(3) UL (ref 1.5–7.7)
NEUTROPHILS # BLD AUTO: 12.58 X10 (3) UL (ref 1.5–7.7)
NEUTROPHILS # BLD AUTO: 12.58 X10(3) UL (ref 1.5–7.7)
NEUTROPHILS # BLD AUTO: 13.73 X10 (3) UL (ref 1.5–7.7)
NEUTROPHILS # BLD AUTO: 13.73 X10(3) UL (ref 1.5–7.7)
NEUTROPHILS # BLD AUTO: 15.11 X10 (3) UL (ref 1.5–7.7)
NEUTROPHILS # BLD AUTO: 15.11 X10(3) UL (ref 1.5–7.7)
NEUTROPHILS # BLD AUTO: 16.48 X10 (3) UL (ref 1.5–7.7)
NEUTROPHILS # BLD AUTO: 16.48 X10(3) UL (ref 1.5–7.7)
NEUTROPHILS # BLD AUTO: 6.1 X10 (3) UL (ref 1.5–7.7)
NEUTROPHILS # BLD AUTO: 6.1 X10(3) UL (ref 1.5–7.7)
NEUTROPHILS # BLD AUTO: 7.5 X10 (3) UL (ref 1.5–7.7)
NEUTROPHILS # BLD AUTO: 7.5 X10(3) UL (ref 1.5–7.7)
NEUTROPHILS # BLD AUTO: 8.65 X10 (3) UL (ref 1.5–7.7)
NEUTROPHILS # BLD AUTO: 8.65 X10(3) UL (ref 1.5–7.7)
NEUTROPHILS # BLD AUTO: 9.62 X10 (3) UL (ref 1.5–7.7)
NEUTROPHILS # BLD AUTO: 9.62 X10(3) UL (ref 1.5–7.7)
NEUTROPHILS NFR BLD AUTO: 69.9 %
NEUTROPHILS NFR BLD AUTO: 72.3 %
NEUTROPHILS NFR BLD AUTO: 78.1 %
NEUTROPHILS NFR BLD AUTO: 80.3 %
NEUTROPHILS NFR BLD AUTO: 81.6 %
NEUTROPHILS NFR BLD AUTO: 83.8 %
NEUTROPHILS NFR BLD AUTO: 85.5 %
NEUTROPHILS NFR BLD AUTO: 85.6 %
NEUTROPHILS NFR BLD AUTO: 88.5 %
NITRITE UR QL STRIP.AUTO: NEGATIVE
NONHDLC SERPL-MCNC: 113 MG/DL (ref ?–130)
OSMOLALITY SERPL CALC.SUM OF ELEC: 286 MOSM/KG (ref 275–295)
OSMOLALITY SERPL CALC.SUM OF ELEC: 288 MOSM/KG (ref 275–295)
OSMOLALITY SERPL CALC.SUM OF ELEC: 288 MOSM/KG (ref 275–295)
OSMOLALITY SERPL CALC.SUM OF ELEC: 289 MOSM/KG (ref 275–295)
OSMOLALITY SERPL CALC.SUM OF ELEC: 290 MOSM/KG (ref 275–295)
OSMOLALITY SERPL CALC.SUM OF ELEC: 290 MOSM/KG (ref 275–295)
OSMOLALITY SERPL CALC.SUM OF ELEC: 291 MOSM/KG (ref 275–295)
OSMOLALITY SERPL CALC.SUM OF ELEC: 291 MOSM/KG (ref 275–295)
OSMOLALITY SERPL CALC.SUM OF ELEC: 294 MOSM/KG (ref 275–295)
OSMOLALITY SERPL CALC.SUM OF ELEC: 296 MOSM/KG (ref 275–295)
PARAINFLUENZA 1 PCR:: NEGATIVE
PARAINFLUENZA 2 PCR:: NEGATIVE
PARAINFLUENZA 3 PCR:: NEGATIVE
PARAINFLUENZA 4 PCR:: NEGATIVE
PH UR: 6 [PH] (ref 5–8)
PHOSPHATE SERPL-MCNC: 3.2 MG/DL (ref 2.5–4.9)
PLATELET # BLD AUTO: 212 10(3)UL (ref 150–450)
PLATELET # BLD AUTO: 215 10(3)UL (ref 150–450)
PLATELET # BLD AUTO: 240 10(3)UL (ref 150–450)
PLATELET # BLD AUTO: 244 10(3)UL (ref 150–450)
PLATELET # BLD AUTO: 258 10(3)UL (ref 150–450)
PLATELET # BLD AUTO: 287 10(3)UL (ref 150–450)
PLATELET # BLD AUTO: 293 10(3)UL (ref 150–450)
PLATELET # BLD AUTO: 317 10(3)UL (ref 150–450)
PLATELET # BLD AUTO: 381 10(3)UL (ref 150–450)
PLATELET # BLD AUTO: 387 10(3)UL (ref 150–450)
POTASSIUM SERPL-SCNC: 3 MMOL/L (ref 3.5–5.1)
POTASSIUM SERPL-SCNC: 3.4 MMOL/L (ref 3.5–5.1)
POTASSIUM SERPL-SCNC: 3.5 MMOL/L (ref 3.5–5.1)
POTASSIUM SERPL-SCNC: 3.6 MMOL/L (ref 3.5–5.1)
POTASSIUM SERPL-SCNC: 3.7 MMOL/L (ref 3.5–5.1)
POTASSIUM SERPL-SCNC: 3.7 MMOL/L (ref 3.5–5.1)
POTASSIUM SERPL-SCNC: 3.9 MMOL/L (ref 3.5–5.1)
POTASSIUM SERPL-SCNC: 4 MMOL/L (ref 3.5–5.1)
POTASSIUM SERPL-SCNC: 4.2 MMOL/L (ref 3.5–5.1)
POTASSIUM SERPL-SCNC: 4.3 MMOL/L (ref 3.5–5.1)
POTASSIUM SERPL-SCNC: 4.4 MMOL/L (ref 3.5–5.1)
PROT UR-MCNC: NEGATIVE MG/DL
RBC # BLD AUTO: 3.97 X10(6)UL (ref 3.8–5.8)
RBC # BLD AUTO: 4.01 X10(6)UL (ref 3.8–5.8)
RBC # BLD AUTO: 4.04 X10(6)UL (ref 3.8–5.8)
RBC # BLD AUTO: 4.05 X10(6)UL (ref 3.8–5.8)
RBC # BLD AUTO: 4.13 X10(6)UL (ref 3.8–5.8)
RBC # BLD AUTO: 4.3 X10(6)UL (ref 3.8–5.8)
RBC # BLD AUTO: 4.31 X10(6)UL (ref 3.8–5.8)
RBC # BLD AUTO: 4.36 X10(6)UL (ref 3.8–5.8)
RBC # BLD AUTO: 4.53 X10(6)UL (ref 3.8–5.8)
RBC # BLD AUTO: 4.58 X10(6)UL (ref 3.8–5.8)
RH BLOOD TYPE: NEGATIVE
RH BLOOD TYPE: NEGATIVE
RHINOVIRUS/ENTERO PCR:: NEGATIVE
RSV RNA SPEC QL NAA+PROBE: NEGATIVE
SARS-COV-2 RNA RESP QL NAA+PROBE: NOT DETECTED
SODIUM SERPL-SCNC: 139 MMOL/L (ref 136–145)
SODIUM SERPL-SCNC: 140 MMOL/L (ref 136–145)
SODIUM SERPL-SCNC: 141 MMOL/L (ref 136–145)
SODIUM SERPL-SCNC: 142 MMOL/L (ref 136–145)
SODIUM SERPL-SCNC: 143 MMOL/L (ref 136–145)
SP GR UR STRIP: 1.01 (ref 1–1.03)
TRIGL SERPL-MCNC: 124 MG/DL (ref 30–149)
TROPONIN I SERPL-MCNC: <0.045 NG/ML (ref ?–0.04)
UROBILINOGEN UR STRIP-ACNC: <2
VLDLC SERPL CALC-MCNC: 25 MG/DL (ref 0–30)
WBC # BLD AUTO: 10.4 X10(3) UL (ref 4–11)
WBC # BLD AUTO: 10.8 X10(3) UL (ref 4–11)
WBC # BLD AUTO: 11.8 X10(3) UL (ref 4–11)
WBC # BLD AUTO: 11.8 X10(3) UL (ref 4–11)
WBC # BLD AUTO: 13.9 X10(3) UL (ref 4–11)
WBC # BLD AUTO: 15 X10(3) UL (ref 4–11)
WBC # BLD AUTO: 16 X10(3) UL (ref 4–11)
WBC # BLD AUTO: 17.1 X10(3) UL (ref 4–11)
WBC # BLD AUTO: 19.3 X10(3) UL (ref 4–11)
WBC # BLD AUTO: 8.7 X10(3) UL (ref 4–11)

## 2020-01-01 PROCEDURE — 75774 ARTERY X-RAY EACH VESSEL: CPT

## 2020-01-01 PROCEDURE — 71045 X-RAY EXAM CHEST 1 VIEW: CPT | Performed by: INTERNAL MEDICINE

## 2020-01-01 PROCEDURE — 99233 SBSQ HOSP IP/OBS HIGH 50: CPT | Performed by: INTERNAL MEDICINE

## 2020-01-01 PROCEDURE — 71045 X-RAY EXAM CHEST 1 VIEW: CPT | Performed by: EMERGENCY MEDICINE

## 2020-01-01 PROCEDURE — 85025 COMPLETE CBC W/AUTO DIFF WBC: CPT

## 2020-01-01 PROCEDURE — 99153 MOD SED SAME PHYS/QHP EA: CPT

## 2020-01-01 PROCEDURE — 99232 SBSQ HOSP IP/OBS MODERATE 35: CPT | Performed by: INTERNAL MEDICINE

## 2020-01-01 PROCEDURE — 99233 SBSQ HOSP IP/OBS HIGH 50: CPT | Performed by: HOSPITALIST

## 2020-01-01 PROCEDURE — 99442 PHONE E/M BY PHYS 11-20 MIN: CPT | Performed by: OTHER

## 2020-01-01 PROCEDURE — 047N3Z1 DILATION OF LEFT POPLITEAL ARTERY USING DRUG-COATED BALLOON, PERCUTANEOUS APPROACH: ICD-10-PCS | Performed by: RADIOLOGY

## 2020-01-01 PROCEDURE — 86900 BLOOD TYPING SEROLOGIC ABO: CPT

## 2020-01-01 PROCEDURE — 37224 HC TRANSLUMINAL ANGIOPLASTY FEM POP UNILATERAL: CPT

## 2020-01-01 PROCEDURE — 78452 HT MUSCLE IMAGE SPECT MULT: CPT | Performed by: PHYSICIAN ASSISTANT

## 2020-01-01 PROCEDURE — 81003 URINALYSIS AUTO W/O SCOPE: CPT

## 2020-01-01 PROCEDURE — 86850 RBC ANTIBODY SCREEN: CPT

## 2020-01-01 PROCEDURE — 99232 SBSQ HOSP IP/OBS MODERATE 35: CPT | Performed by: OTHER

## 2020-01-01 PROCEDURE — 85027 COMPLETE CBC AUTOMATED: CPT | Performed by: CLINICAL NURSE SPECIALIST

## 2020-01-01 PROCEDURE — B41G1ZZ FLUOROSCOPY OF LEFT LOWER EXTREMITY ARTERIES USING LOW OSMOLAR CONTRAST: ICD-10-PCS | Performed by: RADIOLOGY

## 2020-01-01 PROCEDURE — 70450 CT HEAD/BRAIN W/O DYE: CPT | Performed by: EMERGENCY MEDICINE

## 2020-01-01 PROCEDURE — 71046 X-RAY EXAM CHEST 2 VIEWS: CPT | Performed by: THORACIC SURGERY (CARDIOTHORACIC VASCULAR SURGERY)

## 2020-01-01 PROCEDURE — 93016 CV STRESS TEST SUPVJ ONLY: CPT | Performed by: PHYSICIAN ASSISTANT

## 2020-01-01 PROCEDURE — 93306 TTE W/DOPPLER COMPLETE: CPT | Performed by: OTHER

## 2020-01-01 PROCEDURE — 72125 CT NECK SPINE W/O DYE: CPT | Performed by: EMERGENCY MEDICINE

## 2020-01-01 PROCEDURE — 0BP1XDZ REMOVAL OF INTRALUMINAL DEVICE FROM TRACHEA, EXTERNAL APPROACH: ICD-10-PCS | Performed by: OTHER

## 2020-01-01 PROCEDURE — 37221 HC TRANSLUMINAL ANGIOPLASTY W STENT ILIAC UNILAT INIT: CPT

## 2020-01-01 PROCEDURE — 97597 DBRDMT OPN WND 1ST 20 CM/<: CPT

## 2020-01-01 PROCEDURE — 99239 HOSP IP/OBS DSCHRG MGMT >30: CPT | Performed by: HOSPITALIST

## 2020-01-01 PROCEDURE — 99152 MOD SED SAME PHYS/QHP 5/>YRS: CPT

## 2020-01-01 PROCEDURE — 70450 CT HEAD/BRAIN W/O DYE: CPT | Performed by: INTERNAL MEDICINE

## 2020-01-01 PROCEDURE — 85347 COAGULATION TIME ACTIVATED: CPT

## 2020-01-01 PROCEDURE — 0BH17EZ INSERTION OF ENDOTRACHEAL AIRWAY INTO TRACHEA, VIA NATURAL OR ARTIFICIAL OPENING: ICD-10-PCS | Performed by: EMERGENCY MEDICINE

## 2020-01-01 PROCEDURE — 87641 MR-STAPH DNA AMP PROBE: CPT

## 2020-01-01 PROCEDURE — 93018 CV STRESS TEST I&R ONLY: CPT | Performed by: PHYSICIAN ASSISTANT

## 2020-01-01 PROCEDURE — 99221 1ST HOSP IP/OBS SF/LOW 40: CPT | Performed by: INTERNAL MEDICINE

## 2020-01-01 PROCEDURE — 93017 CV STRESS TEST TRACING ONLY: CPT | Performed by: PHYSICIAN ASSISTANT

## 2020-01-01 PROCEDURE — 93880 EXTRACRANIAL BILAT STUDY: CPT | Performed by: OTHER

## 2020-01-01 PROCEDURE — 99223 1ST HOSP IP/OBS HIGH 75: CPT | Performed by: OTHER

## 2020-01-01 PROCEDURE — 74230 X-RAY XM SWLNG FUNCJ C+: CPT | Performed by: HOSPITALIST

## 2020-01-01 PROCEDURE — 99233 SBSQ HOSP IP/OBS HIGH 50: CPT | Performed by: OTHER

## 2020-01-01 PROCEDURE — 86901 BLOOD TYPING SEROLOGIC RH(D): CPT

## 2020-01-01 PROCEDURE — 03CL0ZZ EXTIRPATION OF MATTER FROM LEFT INTERNAL CAROTID ARTERY, OPEN APPROACH: ICD-10-PCS | Performed by: THORACIC SURGERY (CARDIOTHORACIC VASCULAR SURGERY)

## 2020-01-01 PROCEDURE — 82565 ASSAY OF CREATININE: CPT

## 2020-01-01 PROCEDURE — 36415 COLL VENOUS BLD VENIPUNCTURE: CPT

## 2020-01-01 PROCEDURE — 70498 CT ANGIOGRAPHY NECK: CPT | Performed by: PHYSICIAN ASSISTANT

## 2020-01-01 PROCEDURE — 5A1945Z RESPIRATORY VENTILATION, 24-96 CONSECUTIVE HOURS: ICD-10-PCS | Performed by: EMERGENCY MEDICINE

## 2020-01-01 PROCEDURE — B41F1ZZ FLUOROSCOPY OF RIGHT LOWER EXTREMITY ARTERIES USING LOW OSMOLAR CONTRAST: ICD-10-PCS | Performed by: RADIOLOGY

## 2020-01-01 PROCEDURE — 03UL0JZ SUPPLEMENT LEFT INTERNAL CAROTID ARTERY WITH SYNTHETIC SUBSTITUTE, OPEN APPROACH: ICD-10-PCS | Performed by: THORACIC SURGERY (CARDIOTHORACIC VASCULAR SURGERY)

## 2020-01-01 PROCEDURE — 93880 EXTRACRANIAL BILAT STUDY: CPT | Performed by: CLINICAL NURSE SPECIALIST

## 2020-01-01 PROCEDURE — 80048 BASIC METABOLIC PNL TOTAL CA: CPT | Performed by: CLINICAL NURSE SPECIALIST

## 2020-01-01 PROCEDURE — 047D3DZ DILATION OF LEFT COMMON ILIAC ARTERY WITH INTRALUMINAL DEVICE, PERCUTANEOUS APPROACH: ICD-10-PCS | Performed by: RADIOLOGY

## 2020-01-01 PROCEDURE — 75716 ARTERY X-RAYS ARMS/LEGS: CPT

## 2020-01-01 PROCEDURE — 99214 OFFICE O/P EST MOD 30 MIN: CPT

## 2020-01-01 PROCEDURE — 99232 SBSQ HOSP IP/OBS MODERATE 35: CPT | Performed by: HOSPITALIST

## 2020-01-01 PROCEDURE — 73706 CT ANGIO LWR EXTR W/O&W/DYE: CPT | Performed by: RADIOLOGY

## 2020-01-01 PROCEDURE — 80053 COMPREHEN METABOLIC PANEL: CPT

## 2020-01-01 PROCEDURE — 70553 MRI BRAIN STEM W/O & W/DYE: CPT | Performed by: INTERNAL MEDICINE

## 2020-01-01 DEVICE — PATCH CV 8X.8CM VSGRD GLBL BVN: Type: IMPLANTABLE DEVICE | Site: NECK | Status: FUNCTIONAL

## 2020-01-01 RX ORDER — CLOPIDOGREL BISULFATE 75 MG/1
75 TABLET ORAL DAILY
Status: DISCONTINUED | OUTPATIENT
Start: 2020-01-01 | End: 2020-01-01

## 2020-01-01 RX ORDER — HALOPERIDOL 5 MG/ML
0.25 INJECTION INTRAMUSCULAR ONCE AS NEEDED
Status: DISCONTINUED | OUTPATIENT
Start: 2020-01-01 | End: 2020-01-01 | Stop reason: HOSPADM

## 2020-01-01 RX ORDER — LEVOFLOXACIN 750 MG/1
750 TABLET ORAL DAILY
Qty: 3 TABLET | Refills: 0 | Status: SHIPPED | OUTPATIENT
Start: 2020-01-01 | End: 2020-01-01

## 2020-01-01 RX ORDER — HYDROCODONE BITARTRATE AND ACETAMINOPHEN 5; 325 MG/1; MG/1
2 TABLET ORAL AS NEEDED
Status: DISCONTINUED | OUTPATIENT
Start: 2020-01-01 | End: 2020-01-01 | Stop reason: HOSPADM

## 2020-01-01 RX ORDER — SODIUM CHLORIDE, SODIUM LACTATE, POTASSIUM CHLORIDE, CALCIUM CHLORIDE 600; 310; 30; 20 MG/100ML; MG/100ML; MG/100ML; MG/100ML
INJECTION, SOLUTION INTRAVENOUS CONTINUOUS
Status: DISCONTINUED | OUTPATIENT
Start: 2020-01-01 | End: 2020-01-01

## 2020-01-01 RX ORDER — POTASSIUM CHLORIDE 1.5 G/1.77G
40 POWDER, FOR SOLUTION ORAL EVERY 4 HOURS
Status: COMPLETED | OUTPATIENT
Start: 2020-01-01 | End: 2020-01-01

## 2020-01-01 RX ORDER — LABETALOL HYDROCHLORIDE 5 MG/ML
INJECTION, SOLUTION INTRAVENOUS AS NEEDED
Status: DISCONTINUED | OUTPATIENT
Start: 2020-01-01 | End: 2020-01-01 | Stop reason: SURG

## 2020-01-01 RX ORDER — GLYCOPYRROLATE 0.2 MG/ML
INJECTION, SOLUTION INTRAMUSCULAR; INTRAVENOUS AS NEEDED
Status: DISCONTINUED | OUTPATIENT
Start: 2020-01-01 | End: 2020-01-01 | Stop reason: SURG

## 2020-01-01 RX ORDER — DEXAMETHASONE SODIUM PHOSPHATE 4 MG/ML
VIAL (ML) INJECTION AS NEEDED
Status: DISCONTINUED | OUTPATIENT
Start: 2020-01-01 | End: 2020-01-01 | Stop reason: SURG

## 2020-01-01 RX ORDER — SODIUM CHLORIDE 9 MG/ML
INJECTION, SOLUTION INTRAVENOUS CONTINUOUS PRN
Status: DISCONTINUED | OUTPATIENT
Start: 2020-01-01 | End: 2020-01-01 | Stop reason: SURG

## 2020-01-01 RX ORDER — DEXTROSE AND SODIUM CHLORIDE 5; .45 G/100ML; G/100ML
INJECTION, SOLUTION INTRAVENOUS CONTINUOUS
Status: DISCONTINUED | OUTPATIENT
Start: 2020-01-01 | End: 2020-01-01

## 2020-01-01 RX ORDER — GARLIC EXTRACT 500 MG
1 CAPSULE ORAL DAILY
COMMUNITY

## 2020-01-01 RX ORDER — LORAZEPAM 2 MG/ML
1 INJECTION INTRAMUSCULAR ONCE
Status: COMPLETED | OUTPATIENT
Start: 2020-01-01 | End: 2020-01-01

## 2020-01-01 RX ORDER — POTASSIUM CHLORIDE 20 MEQ/1
40 TABLET, EXTENDED RELEASE ORAL EVERY 4 HOURS
Status: COMPLETED | OUTPATIENT
Start: 2020-01-01 | End: 2020-01-01

## 2020-01-01 RX ORDER — LEVETIRACETAM 500 MG/1
500 TABLET ORAL 2 TIMES DAILY
Status: DISCONTINUED | OUTPATIENT
Start: 2020-01-01 | End: 2020-01-01

## 2020-01-01 RX ORDER — FAMOTIDINE 20 MG/1
20 TABLET ORAL ONCE
Status: DISCONTINUED | OUTPATIENT
Start: 2020-01-01 | End: 2020-01-01 | Stop reason: HOSPADM

## 2020-01-01 RX ORDER — LISINOPRIL 10 MG/1
10 TABLET ORAL DAILY
Status: DISCONTINUED | OUTPATIENT
Start: 2020-01-01 | End: 2020-01-01

## 2020-01-01 RX ORDER — ASPIRIN 81 MG/1
81 TABLET, CHEWABLE ORAL DAILY
Status: DISCONTINUED | OUTPATIENT
Start: 2020-01-01 | End: 2020-01-01

## 2020-01-01 RX ORDER — IBUPROFEN 200 MG
600 TABLET ORAL AS NEEDED
COMMUNITY

## 2020-01-01 RX ORDER — HEPARIN SODIUM 1000 [USP'U]/ML
INJECTION, SOLUTION INTRAVENOUS; SUBCUTANEOUS AS NEEDED
Status: DISCONTINUED | OUTPATIENT
Start: 2020-01-01 | End: 2020-01-01 | Stop reason: SURG

## 2020-01-01 RX ORDER — CHOLECALCIFEROL (VITAMIN D3) 25 MCG
1 TABLET,CHEWABLE ORAL DAILY
COMMUNITY

## 2020-01-01 RX ORDER — LEVETIRACETAM 500 MG/1
500 TABLET ORAL 2 TIMES DAILY
Qty: 60 TABLET | Refills: 0 | Status: SHIPPED | OUTPATIENT
Start: 2020-01-01

## 2020-01-01 RX ORDER — MORPHINE SULFATE 4 MG/ML
2 INJECTION, SOLUTION INTRAMUSCULAR; INTRAVENOUS EVERY 10 MIN PRN
Status: DISCONTINUED | OUTPATIENT
Start: 2020-01-01 | End: 2020-01-01 | Stop reason: HOSPADM

## 2020-01-01 RX ORDER — ASPIRIN 81 MG/1
81 TABLET, CHEWABLE ORAL DAILY
Qty: 30 TABLET | Refills: 0 | Status: SHIPPED | OUTPATIENT
Start: 2020-01-01

## 2020-01-01 RX ORDER — VITAMIN E 268 MG
800 CAPSULE ORAL DAILY
COMMUNITY

## 2020-01-01 RX ORDER — HYDROCODONE BITARTRATE AND ACETAMINOPHEN 5; 325 MG/1; MG/1
1 TABLET ORAL AS NEEDED
Status: DISCONTINUED | OUTPATIENT
Start: 2020-01-01 | End: 2020-01-01 | Stop reason: HOSPADM

## 2020-01-01 RX ORDER — BUPIVACAINE HYDROCHLORIDE AND EPINEPHRINE 5; 5 MG/ML; UG/ML
INJECTION, SOLUTION PERINEURAL AS NEEDED
Status: DISCONTINUED | OUTPATIENT
Start: 2020-01-01 | End: 2020-01-01 | Stop reason: HOSPADM

## 2020-01-01 RX ORDER — CARVEDILOL 25 MG/1
25 TABLET ORAL 2 TIMES DAILY WITH MEALS
Status: DISCONTINUED | OUTPATIENT
Start: 2020-01-01 | End: 2020-01-01

## 2020-01-01 RX ORDER — LISINOPRIL 10 MG/1
10 TABLET ORAL DAILY
COMMUNITY

## 2020-01-01 RX ORDER — HEPARIN SODIUM 5000 [USP'U]/ML
5000 INJECTION, SOLUTION INTRAVENOUS; SUBCUTANEOUS EVERY 12 HOURS SCHEDULED
Status: DISCONTINUED | OUTPATIENT
Start: 2020-01-01 | End: 2020-01-01

## 2020-01-01 RX ORDER — NITROGLYCERIN 20 MG/100ML
INJECTION INTRAVENOUS CONTINUOUS PRN
Status: DISCONTINUED | OUTPATIENT
Start: 2020-01-01 | End: 2020-01-01

## 2020-01-01 RX ORDER — HYDROMORPHONE HYDROCHLORIDE 1 MG/ML
0.2 INJECTION, SOLUTION INTRAMUSCULAR; INTRAVENOUS; SUBCUTANEOUS EVERY 5 MIN PRN
Status: DISCONTINUED | OUTPATIENT
Start: 2020-01-01 | End: 2020-01-01 | Stop reason: HOSPADM

## 2020-01-01 RX ORDER — DIPHENHYDRAMINE HYDROCHLORIDE 50 MG/ML
INJECTION INTRAMUSCULAR; INTRAVENOUS
Status: COMPLETED
Start: 2020-01-01 | End: 2020-01-01

## 2020-01-01 RX ORDER — HYDROMORPHONE HYDROCHLORIDE 1 MG/ML
0.6 INJECTION, SOLUTION INTRAMUSCULAR; INTRAVENOUS; SUBCUTANEOUS EVERY 5 MIN PRN
Status: DISCONTINUED | OUTPATIENT
Start: 2020-01-01 | End: 2020-01-01 | Stop reason: HOSPADM

## 2020-01-01 RX ORDER — LABETALOL HYDROCHLORIDE 5 MG/ML
10 INJECTION, SOLUTION INTRAVENOUS ONCE
Status: COMPLETED | OUTPATIENT
Start: 2020-01-01 | End: 2020-01-01

## 2020-01-01 RX ORDER — MULTIVIT WITH MINERALS/LUTEIN
1000 TABLET ORAL DAILY
COMMUNITY

## 2020-01-01 RX ORDER — POTASSIUM CHLORIDE 1.5 G/1.77G
40 POWDER, FOR SOLUTION ORAL ONCE
Status: COMPLETED | OUTPATIENT
Start: 2020-01-01 | End: 2020-01-01

## 2020-01-01 RX ORDER — ALBUTEROL SULFATE 90 UG/1
2 AEROSOL, METERED RESPIRATORY (INHALATION) EVERY 4 HOURS PRN
Status: DISCONTINUED | OUTPATIENT
Start: 2020-01-01 | End: 2020-01-01

## 2020-01-01 RX ORDER — LEVETIRACETAM 100 MG/ML
500 SOLUTION ORAL 2 TIMES DAILY
Status: DISCONTINUED | OUTPATIENT
Start: 2020-01-01 | End: 2020-01-01

## 2020-01-01 RX ORDER — HYDROCODONE BITARTRATE AND ACETAMINOPHEN 5; 325 MG/1; MG/1
1 TABLET ORAL EVERY 8 HOURS PRN
Status: DISCONTINUED | OUTPATIENT
Start: 2020-01-01 | End: 2020-01-01

## 2020-01-01 RX ORDER — HYDRALAZINE HYDROCHLORIDE 20 MG/ML
10 INJECTION INTRAMUSCULAR; INTRAVENOUS EVERY 4 HOURS PRN
Status: DISCONTINUED | OUTPATIENT
Start: 2020-01-01 | End: 2020-01-01

## 2020-01-01 RX ORDER — MORPHINE SULFATE 10 MG/ML
6 INJECTION, SOLUTION INTRAMUSCULAR; INTRAVENOUS EVERY 10 MIN PRN
Status: DISCONTINUED | OUTPATIENT
Start: 2020-01-01 | End: 2020-01-01 | Stop reason: HOSPADM

## 2020-01-01 RX ORDER — HYDRALAZINE HYDROCHLORIDE 20 MG/ML
10 INJECTION INTRAMUSCULAR; INTRAVENOUS ONCE
Status: COMPLETED | OUTPATIENT
Start: 2020-01-01 | End: 2020-01-01

## 2020-01-01 RX ORDER — LEVETIRACETAM 500 MG/1
500 TABLET ORAL 2 TIMES DAILY
Qty: 180 TABLET | Refills: 3 | Status: SHIPPED | OUTPATIENT
Start: 2020-01-01

## 2020-01-01 RX ORDER — ACETAMINOPHEN 500 MG
1000 TABLET ORAL ONCE
Status: COMPLETED | OUTPATIENT
Start: 2020-01-01 | End: 2020-01-01

## 2020-01-01 RX ORDER — HYDRALAZINE HYDROCHLORIDE 20 MG/ML
10 INJECTION INTRAMUSCULAR; INTRAVENOUS EVERY 6 HOURS PRN
Status: DISCONTINUED | OUTPATIENT
Start: 2020-01-01 | End: 2020-01-01

## 2020-01-01 RX ORDER — MORPHINE SULFATE 4 MG/ML
4 INJECTION, SOLUTION INTRAMUSCULAR; INTRAVENOUS EVERY 10 MIN PRN
Status: DISCONTINUED | OUTPATIENT
Start: 2020-01-01 | End: 2020-01-01 | Stop reason: HOSPADM

## 2020-01-01 RX ORDER — MAGNESIUM SULFATE HEPTAHYDRATE 40 MG/ML
2 INJECTION, SOLUTION INTRAVENOUS ONCE
Status: COMPLETED | OUTPATIENT
Start: 2020-01-01 | End: 2020-01-01

## 2020-01-01 RX ORDER — NALOXONE HYDROCHLORIDE 0.4 MG/ML
80 INJECTION, SOLUTION INTRAMUSCULAR; INTRAVENOUS; SUBCUTANEOUS AS NEEDED
Status: DISCONTINUED | OUTPATIENT
Start: 2020-01-01 | End: 2020-01-01 | Stop reason: HOSPADM

## 2020-01-01 RX ORDER — ETOMIDATE 2 MG/ML
INJECTION INTRAVENOUS
Status: COMPLETED | OUTPATIENT
Start: 2020-01-01 | End: 2020-01-01

## 2020-01-01 RX ORDER — ASPIRIN 300 MG
300 SUPPOSITORY, RECTAL RECTAL ONCE
Status: DISCONTINUED | OUTPATIENT
Start: 2020-01-01 | End: 2020-01-01

## 2020-01-01 RX ORDER — CEFAZOLIN SODIUM/WATER 2 G/20 ML
2 SYRINGE (ML) INTRAVENOUS ONCE
Status: COMPLETED | OUTPATIENT
Start: 2020-01-01 | End: 2020-01-01

## 2020-01-01 RX ORDER — ACETAMINOPHEN 500 MG
1000 TABLET ORAL EVERY 6 HOURS PRN
Status: DISCONTINUED | OUTPATIENT
Start: 2020-01-01 | End: 2020-01-01

## 2020-01-01 RX ORDER — ACETAMINOPHEN 500 MG
1000 TABLET ORAL EVERY 6 HOURS PRN
COMMUNITY

## 2020-01-01 RX ORDER — EPHEDRINE SULFATE 50 MG/ML
INJECTION, SOLUTION INTRAVENOUS AS NEEDED
Status: DISCONTINUED | OUTPATIENT
Start: 2020-01-01 | End: 2020-01-01 | Stop reason: SURG

## 2020-01-01 RX ORDER — HYDROMORPHONE HYDROCHLORIDE 1 MG/ML
0.4 INJECTION, SOLUTION INTRAMUSCULAR; INTRAVENOUS; SUBCUTANEOUS EVERY 5 MIN PRN
Status: DISCONTINUED | OUTPATIENT
Start: 2020-01-01 | End: 2020-01-01 | Stop reason: HOSPADM

## 2020-01-01 RX ORDER — METOPROLOL TARTRATE 5 MG/5ML
2.5 INJECTION INTRAVENOUS
Status: DISCONTINUED | OUTPATIENT
Start: 2020-01-01 | End: 2020-01-01

## 2020-01-01 RX ORDER — VANCOMYCIN HYDROCHLORIDE 125 MG/1
125 CAPSULE ORAL DAILY
Status: DISCONTINUED | OUTPATIENT
Start: 2020-01-01 | End: 2020-01-01

## 2020-01-01 RX ORDER — PROTAMINE SULFATE 10 MG/ML
INJECTION, SOLUTION INTRAVENOUS AS NEEDED
Status: DISCONTINUED | OUTPATIENT
Start: 2020-01-01 | End: 2020-01-01 | Stop reason: SURG

## 2020-01-01 RX ORDER — METOPROLOL TARTRATE 5 MG/5ML
2.5 INJECTION INTRAVENOUS ONCE
Status: DISCONTINUED | OUTPATIENT
Start: 2020-01-01 | End: 2020-01-01 | Stop reason: HOSPADM

## 2020-01-01 RX ORDER — LEVOFLOXACIN 750 MG/1
750 TABLET ORAL DAILY
Status: DISCONTINUED | OUTPATIENT
Start: 2020-01-01 | End: 2020-01-01

## 2020-01-01 RX ORDER — MIDAZOLAM HYDROCHLORIDE 1 MG/ML
INJECTION INTRAMUSCULAR; INTRAVENOUS
Status: COMPLETED
Start: 2020-01-01 | End: 2020-01-01

## 2020-01-01 RX ORDER — HYDROCODONE BITARTRATE AND ACETAMINOPHEN 7.5; 325 MG/1; MG/1
1 TABLET ORAL EVERY 6 HOURS PRN
Status: ON HOLD | COMMUNITY
End: 2020-01-01

## 2020-01-01 RX ORDER — CLOPIDOGREL BISULFATE 75 MG/1
75 TABLET ORAL DAILY
Qty: 30 TABLET | Refills: 0 | Status: SHIPPED | OUTPATIENT
Start: 2020-01-01

## 2020-01-01 RX ORDER — HYDROCODONE BITARTRATE AND ACETAMINOPHEN 7.5; 325 MG/1; MG/1
1 TABLET ORAL EVERY 6 HOURS PRN
Qty: 30 TABLET | Refills: 0 | Status: SHIPPED | OUTPATIENT
Start: 2020-01-01

## 2020-01-01 RX ORDER — HEPARIN SODIUM 1000 [USP'U]/ML
INJECTION, SOLUTION INTRAVENOUS; SUBCUTANEOUS
Status: COMPLETED
Start: 2020-01-01 | End: 2020-01-01

## 2020-01-01 RX ORDER — ONDANSETRON 2 MG/ML
INJECTION INTRAMUSCULAR; INTRAVENOUS AS NEEDED
Status: DISCONTINUED | OUTPATIENT
Start: 2020-01-01 | End: 2020-01-01 | Stop reason: SURG

## 2020-01-01 RX ORDER — ATORVASTATIN CALCIUM 10 MG/1
10 TABLET, FILM COATED ORAL NIGHTLY
Status: DISCONTINUED | OUTPATIENT
Start: 2020-01-01 | End: 2020-01-01

## 2020-01-01 RX ORDER — LIDOCAINE HYDROCHLORIDE 20 MG/ML
INJECTION, SOLUTION EPIDURAL; INFILTRATION; INTRACAUDAL; PERINEURAL
Status: COMPLETED
Start: 2020-01-01 | End: 2020-01-01

## 2020-01-01 RX ORDER — DEXTROSE MONOHYDRATE 25 G/50ML
INJECTION, SOLUTION INTRAVENOUS
Status: COMPLETED
Start: 2020-01-01 | End: 2020-01-01

## 2020-01-01 RX ORDER — METOCLOPRAMIDE 10 MG/1
10 TABLET ORAL ONCE
Status: DISCONTINUED | OUTPATIENT
Start: 2020-01-01 | End: 2020-01-01 | Stop reason: HOSPADM

## 2020-01-01 RX ORDER — SODIUM CHLORIDE, SODIUM LACTATE, POTASSIUM CHLORIDE, CALCIUM CHLORIDE 600; 310; 30; 20 MG/100ML; MG/100ML; MG/100ML; MG/100ML
INJECTION, SOLUTION INTRAVENOUS CONTINUOUS
Status: DISCONTINUED | OUTPATIENT
Start: 2020-01-01 | End: 2020-01-01 | Stop reason: HOSPADM

## 2020-01-01 RX ORDER — ATORVASTATIN CALCIUM 10 MG/1
10 TABLET, FILM COATED ORAL NIGHTLY
Qty: 30 TABLET | Refills: 0 | Status: SHIPPED | OUTPATIENT
Start: 2020-01-01

## 2020-01-01 RX ORDER — AMOXICILLIN 875 MG/1
875 TABLET, COATED ORAL 2 TIMES DAILY
Qty: 20 TABLET | Refills: 0 | Status: SHIPPED | OUTPATIENT
Start: 2020-01-01 | End: 2020-01-01

## 2020-01-01 RX ORDER — ENOXAPARIN SODIUM 100 MG/ML
40 INJECTION SUBCUTANEOUS DAILY
Status: DISCONTINUED | OUTPATIENT
Start: 2020-01-01 | End: 2020-01-01

## 2020-01-01 RX ORDER — ACETAMINOPHEN 325 MG/1
650 TABLET ORAL EVERY 6 HOURS PRN
Status: DISCONTINUED | OUTPATIENT
Start: 2020-01-01 | End: 2020-01-01

## 2020-01-01 RX ORDER — ONDANSETRON 2 MG/ML
4 INJECTION INTRAMUSCULAR; INTRAVENOUS EVERY 6 HOURS PRN
Status: DISCONTINUED | OUTPATIENT
Start: 2020-01-01 | End: 2020-01-01

## 2020-01-01 RX ORDER — IPRATROPIUM BROMIDE AND ALBUTEROL SULFATE 2.5; .5 MG/3ML; MG/3ML
3 SOLUTION RESPIRATORY (INHALATION) EVERY 6 HOURS
Status: DISCONTINUED | OUTPATIENT
Start: 2020-01-01 | End: 2020-01-01

## 2020-01-01 RX ORDER — SODIUM CHLORIDE 9 MG/ML
INJECTION, SOLUTION INTRAVENOUS CONTINUOUS
Status: DISCONTINUED | OUTPATIENT
Start: 2020-01-01 | End: 2020-01-01

## 2020-01-01 RX ORDER — PROCHLORPERAZINE EDISYLATE 5 MG/ML
5 INJECTION INTRAMUSCULAR; INTRAVENOUS ONCE AS NEEDED
Status: DISCONTINUED | OUTPATIENT
Start: 2020-01-01 | End: 2020-01-01 | Stop reason: HOSPADM

## 2020-01-01 RX ORDER — LORAZEPAM 2 MG/ML
INJECTION INTRAMUSCULAR
Status: COMPLETED
Start: 2020-01-01 | End: 2020-01-01

## 2020-01-01 RX ORDER — CEFAZOLIN SODIUM/WATER 2 G/20 ML
2 SYRINGE (ML) INTRAVENOUS EVERY 8 HOURS
Status: COMPLETED | OUTPATIENT
Start: 2020-01-01 | End: 2020-01-01

## 2020-01-01 RX ORDER — MIDAZOLAM HYDROCHLORIDE 1 MG/ML
INJECTION INTRAMUSCULAR; INTRAVENOUS AS NEEDED
Status: DISCONTINUED | OUTPATIENT
Start: 2020-01-01 | End: 2020-01-01 | Stop reason: SURG

## 2020-01-01 RX ORDER — CARVEDILOL 25 MG/1
25 TABLET ORAL 2 TIMES DAILY WITH MEALS
COMMUNITY

## 2020-01-01 RX ORDER — ETOMIDATE 2 MG/ML
INJECTION INTRAVENOUS
Status: DISPENSED
Start: 2020-01-01 | End: 2020-01-01

## 2020-01-01 RX ORDER — NEOSTIGMINE METHYLSULFATE 1 MG/ML
INJECTION INTRAVENOUS AS NEEDED
Status: DISCONTINUED | OUTPATIENT
Start: 2020-01-01 | End: 2020-01-01 | Stop reason: SURG

## 2020-01-01 RX ORDER — PHENYLEPHRINE HCL 10 MG/ML
VIAL (ML) INJECTION AS NEEDED
Status: DISCONTINUED | OUTPATIENT
Start: 2020-01-01 | End: 2020-01-01 | Stop reason: SURG

## 2020-01-01 RX ORDER — LIDOCAINE HYDROCHLORIDE 10 MG/ML
INJECTION, SOLUTION EPIDURAL; INFILTRATION; INTRACAUDAL; PERINEURAL AS NEEDED
Status: DISCONTINUED | OUTPATIENT
Start: 2020-01-01 | End: 2020-01-01 | Stop reason: HOSPADM

## 2020-01-01 RX ORDER — ONDANSETRON 2 MG/ML
4 INJECTION INTRAMUSCULAR; INTRAVENOUS ONCE AS NEEDED
Status: DISCONTINUED | OUTPATIENT
Start: 2020-01-01 | End: 2020-01-01 | Stop reason: HOSPADM

## 2020-01-01 RX ORDER — MAGNESIUM OXIDE 400 MG (241.3 MG MAGNESIUM) TABLET
400 TABLET ONCE
Status: COMPLETED | OUTPATIENT
Start: 2020-01-01 | End: 2020-01-01

## 2020-01-01 RX ORDER — HYDROCODONE BITARTRATE AND ACETAMINOPHEN 7.5; 325 MG/1; MG/1
1 TABLET ORAL EVERY 6 HOURS PRN
Status: DISCONTINUED | OUTPATIENT
Start: 2020-01-01 | End: 2020-01-01

## 2020-01-01 RX ORDER — ASPIRIN 325 MG
325 TABLET ORAL DAILY
Status: DISCONTINUED | OUTPATIENT
Start: 2020-01-01 | End: 2020-01-01

## 2020-01-01 RX ORDER — ROCURONIUM BROMIDE 10 MG/ML
INJECTION, SOLUTION INTRAVENOUS AS NEEDED
Status: DISCONTINUED | OUTPATIENT
Start: 2020-01-01 | End: 2020-01-01 | Stop reason: SURG

## 2020-01-01 RX ADMIN — ROCURONIUM BROMIDE 20 MG: 10 INJECTION, SOLUTION INTRAVENOUS at 13:41:00

## 2020-01-01 RX ADMIN — PHENYLEPHRINE HCL 120 MCG: 10 MG/ML VIAL (ML) INJECTION at 14:10:00

## 2020-01-01 RX ADMIN — DEXAMETHASONE SODIUM PHOSPHATE 4 MG: 4 MG/ML VIAL (ML) INJECTION at 13:53:00

## 2020-01-01 RX ADMIN — HEPARIN SODIUM 9000 UNITS: 1000 INJECTION, SOLUTION INTRAVENOUS; SUBCUTANEOUS at 13:14:00

## 2020-01-01 RX ADMIN — SODIUM CHLORIDE: 9 INJECTION, SOLUTION INTRAVENOUS at 13:53:00

## 2020-01-01 RX ADMIN — SODIUM CHLORIDE, SODIUM LACTATE, POTASSIUM CHLORIDE, CALCIUM CHLORIDE: 600; 310; 30; 20 INJECTION, SOLUTION INTRAVENOUS at 13:53:00

## 2020-01-01 RX ADMIN — ONDANSETRON 4 MG: 2 INJECTION INTRAMUSCULAR; INTRAVENOUS at 13:53:00

## 2020-01-01 RX ADMIN — CEFAZOLIN SODIUM/WATER 2 G: 2 G/20 ML SYRINGE (ML) INTRAVENOUS at 12:34:00

## 2020-01-01 RX ADMIN — PHENYLEPHRINE HCL 100 MCG: 10 MG/ML VIAL (ML) INJECTION at 13:00:00

## 2020-01-01 RX ADMIN — GLYCOPYRROLATE 0.8 MG: 0.2 INJECTION, SOLUTION INTRAMUSCULAR; INTRAVENOUS at 14:13:00

## 2020-01-01 RX ADMIN — PROTAMINE SULFATE 50 MG: 10 INJECTION, SOLUTION INTRAVENOUS at 13:58:00

## 2020-01-01 RX ADMIN — EPHEDRINE SULFATE 10 MG: 50 INJECTION, SOLUTION INTRAVENOUS at 13:01:00

## 2020-01-01 RX ADMIN — LABETALOL HYDROCHLORIDE 10 MG: 5 INJECTION, SOLUTION INTRAVENOUS at 14:20:00

## 2020-01-01 RX ADMIN — ROCURONIUM BROMIDE 30 MG: 10 INJECTION, SOLUTION INTRAVENOUS at 12:25:00

## 2020-01-01 RX ADMIN — SODIUM CHLORIDE: 9 INJECTION, SOLUTION INTRAVENOUS at 12:30:00

## 2020-01-01 RX ADMIN — NEOSTIGMINE METHYLSULFATE 5 MG: 1 INJECTION INTRAVENOUS at 14:13:00

## 2020-01-01 RX ADMIN — EPHEDRINE SULFATE 5 MG: 50 INJECTION, SOLUTION INTRAVENOUS at 13:21:00

## 2020-01-01 RX ADMIN — MIDAZOLAM HYDROCHLORIDE 2 MG: 1 INJECTION INTRAMUSCULAR; INTRAVENOUS at 12:21:00

## 2020-01-01 RX ADMIN — EPHEDRINE SULFATE 5 MG: 50 INJECTION, SOLUTION INTRAVENOUS at 13:27:00

## 2020-01-01 RX ADMIN — ROCURONIUM BROMIDE 20 MG: 10 INJECTION, SOLUTION INTRAVENOUS at 12:54:00

## 2020-01-01 RX ADMIN — EPHEDRINE SULFATE 5 MG: 50 INJECTION, SOLUTION INTRAVENOUS at 13:08:00

## 2020-01-01 RX ADMIN — EPHEDRINE SULFATE 5 MG: 50 INJECTION, SOLUTION INTRAVENOUS at 13:05:00

## 2020-01-01 RX ADMIN — EPHEDRINE SULFATE 10 MG: 50 INJECTION, SOLUTION INTRAVENOUS at 14:10:00

## 2020-01-31 NOTE — PROGRESS NOTES
Patient discharged home with discharge instructions. Verbalized understanding. Right groin remained soft, no swelling, no bleeding following ambulation in hallway.

## 2020-02-06 NOTE — PROGRESS NOTES
Rosalynn Lanes, please confirm whether this patient with heel ulcer has followed through with making an appointment at the [address] [place] [address].

## 2020-02-07 PROBLEM — S91.302A OPEN WOUND OF LEFT FOOT: Status: ACTIVE | Noted: 2020-01-01

## 2020-02-07 NOTE — PROGRESS NOTES
Subjective    Chief Complaint  This information was obtained from the patient  The patient is new to the [address] [facility] here for an initial visit for the evaluation and management of non-healing left heel wound.     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 Distal reconstitution via collateral supply.  -Patent three-vessel runoff. There is moderate to severe stenosis of the tibioperoneal trunk however.     Family History  This inf PHQ2 Depression Screen scale: 0=not at all, 1=several days, 2=more than half the days, 3=nearly every day: 0  Little interest or pleasure in doing things (over the last 2 weeks)?: 0  Feeling down, depressed, or hopeless (over the last 2 weeks)?: 0  Total s S1/S2, regular rate. Palpable pedal pulses. BLE various veins, with hemosiderin staining, RLE +2 edema. Musculoskeletal:  no clubbing, no cyanosis. Integumentary (Hair, Skin)  left lateral heel wound.     Psychiatric:  Appropriate judgement and insigh -no S&S of soft tissue infection  -+2 edema to RLE  -toe nails are intact        Recent labs: 01/31/2020: BUN 7, creatinine 0.77, H&H 13.4&40.4  01/31/2020: IR of angioplasty of lower ex, endorevasculation      Hindering factors to wound healing: current h Status: Initiated Date: 2/7/2020  - Pulses bilaterally on admission and per facility policy when presence of foot or leg ulceration. Status: Initiated Date: 2/7/2020  - Doppler if unable to palpate pulse.   Status: Initiated Date: 2/7/2020  - Nutrition ass CNA/CHT/CMA:  Mancil Shown  Physician / Extender: Berline Hatchet, [address] [facility] Corner: Outpatient  Debridement Performed for Assessment: Wound #1a Left, Lateral Heel  Performed By: Physician LOIS Mayen  Debridement: Selective  Time-Out Taken: Yes  Pain Con

## 2020-02-15 NOTE — PROGRESS NOTES
This patient was seen by me for evaluation of his severe carotid disease in July 2017.   At that time I recommended carotid endarterectomy but due to some financial concerns the patient never followed up and although an order for a stress test was given for

## 2020-02-19 NOTE — H&P (VIEW-ONLY)
[facility]    Report of Consultation    Joaquín Yu     1955 MRN [id number]   Referring Provider Gopi Jarrett MD  [address] [place] [address] PCP Mily Robertson.  MD Sean     Date of Consult:  2020  Reason for Consulta Current Every Day Smoker        Packs/day: 0.00        Years: 40.00        Pack years: 0      Smokeless tobacco: Never Used      Tobacco comment: quit 2/1/18    Alcohol use: Yes      Comment: Occsionally    Drug use: No       Current Medications:  Current WBC 11.8 (H) 01/31/2020    HGB 13.4 01/31/2020    HCT 40.4 01/31/2020    .0 01/31/2020    CREATSERUM 0.77 01/31/2020    BUN 7 01/31/2020     01/31/2020    K 3.5 01/31/2020     01/31/2020    CO2 28.0 01/31/2020    GLU 96 01/31/2020    CA (Mid-longitudinal) End Diastolic Velocity:        212.20 cm/s           Internal Carotid Artery(Proximal)               ICA (Proximal) Peak Systolic Velocity:      490.55 cm/s              ICA (Proximal) End Diastolic Velocity:      407.50 cm/s           C hoarseness, inability to swallow, or control the tongue to enunciate or chew. He is agreed to proceed with the work-up and surgery and we have scheduled tests for him to be done next week followed by carotid surgery if appropriate early in March.     Thank

## 2020-03-09 PROBLEM — I73.9 PAD (PERIPHERAL ARTERY DISEASE) (HCC): Chronic | Status: ACTIVE | Noted: 2020-01-01

## 2020-03-09 PROBLEM — I65.22 STENOSIS OF LEFT CAROTID ARTERY: Status: ACTIVE | Noted: 2017-04-29

## 2020-03-09 NOTE — OPERATIVE REPORT
CV SURGERY OPERATIVE NOTE    DATE 3/9/2020    Pre op Diagnosis: Severe bilateral carotid stenosis  Post op Diagnosis: SAME    Procedure: Left carotid Endarterectomy with Pericardial Patch Reconstruction  Cerebral Oximetry Monitoring  Completion Doppler Exa and cut off transversely on the proximal end. An eversion endarterectomy was performed on the external branch and a smooth feathering end was removed from the internal under direct vision.  Visualizing with microscopic loops all particulate debris was remov

## 2020-03-09 NOTE — ANESTHESIA PREPROCEDURE EVALUATION
Anesthesia PreOp Note    HPI:     Josefa Manriquez is a 71year old male who presents for preoperative consultation requested by: Dorian Rivera MD    Date of Surgery: 3/9/2020    Procedure(s):  CAROTID ENDARTERECTOMY  Indication: Bilateral carotid a History reviewed. No pertinent surgical history. acetaminophen 500 MG Oral Tab, Take 1,000 mg by mouth every 6 (six) hours as needed for Pain., Disp: , Rfl:   Acidophilus/Pectin Oral Cap, Take 1 capsule by mouth daily. , Disp: , Rfl:   Calcium-Magn file      Number of children: 2      Years of education: Not on file      Highest education level: Not on file    Occupational History      Occupation: delivery service    Social Needs      Financial resource strain: Not on file      Food insecurity: 3.7 03/04/2020     03/04/2020    CO2 29.0 03/04/2020    BUN 8 03/04/2020    CREATSERUM 0.72 03/04/2020    GLU 88 03/04/2020    CA 9.2 03/04/2020          Vital Signs: Body mass index is 27.89 kg/m².    height is 1.803 m (5' 11\") and weight is 90.7 k

## 2020-03-09 NOTE — ANESTHESIA POSTPROCEDURE EVALUATION
Patient: Melba Asencio    Procedure Summary     Date:  03/09/20 Room / Location:  [facility] OR  / [facility] OR    Anesthesia Start:  2170 Anesthesia Stop:  5810    Procedure:  CAROTID ENDARTERECTOMY (Left ) Diagnosis:       Bilateral carotid artery primo

## 2020-03-09 NOTE — PROGRESS NOTES
[facility] - [facility]    Progress Note    Kait Prasad Patient Status:  Surgery Admit - Inpt    1955 MRN [id number]   Location [facility] UNIT Attending Nohemy Mccormack MD   Hosp Day # 0 PCP Nohelia Sweeney MD HOME MEDS. PAD (peripheral artery disease) (HCC)  LEFT EXT ILIAC AND DISTAL FEMORAL A STENTS, HEALED LEFT HEEL ULCER.              Results:     Lab Results   Component Value Date    WBC 10.4 03/04/2020    HGB 14.0 03/04/2020    HCT 43.2 03/04/2020

## 2020-03-09 NOTE — ANESTHESIA PROCEDURE NOTES
Peripheral IV  Inserted by: Libby Fernández MD    Placement  Needle size: 18 G  Laterality: right  Location: hand  Local anesthetic: none  Site prep: alcohol  Technique: anatomical landmarks  Attempts: 1

## 2020-03-09 NOTE — ANESTHESIA PROCEDURE NOTES
Airway  Date/Time: 3/9/2020 12:25 PM  Urgency: Elective    Airway not difficult    General Information and Staff    Patient location during procedure: OR  Anesthesiologist: Janina Camacho MD  Performed: anesthesiologist     Indications and Patient Conditio

## 2020-03-09 NOTE — ANESTHESIA PROCEDURE NOTES
Arterial Line  Performed by: Betty Christianson MD  Authorized by: Betty Christianson MD     General Information and Staff    Procedure Start:   Anesthesiologist: Betty Christianson MD  Performed By:  Anesthesiologist  Patient Location:  OR  Indication: continuous b

## 2020-03-09 NOTE — PLAN OF CARE
Arrived from PACU hemodynamically stable, pain controlled  Familia drain intact  Awaiting need for urination, will bladder scan at 9pm if no urine  Bp controlled    Problem: CARDIOVASCULAR - ADULT  Goal: Maintains optimal cardiac output and hemodynamic stabilit Progressing     Problem: GENITOURINARY - ADULT  Goal: Absence of urinary retention  Description  INTERVENTIONS:  - Assess patient’s ability to void and empty bladder  - Monitor intake/output and perform bladder scan as needed  - Follow urinary retention pr

## 2020-03-10 NOTE — PROGRESS NOTES
Patient having elevated blood pressures since start of shift. Coreg (scheduled) given initially w/ minimal improvement. Lopressor IVP prn given x2 with no improvement. Nitro gtt started per prn orders. Titrating now to achieve systolic BP of <858.       Add

## 2020-03-10 NOTE — DISCHARGE SUMMARY
[facility]    Discharge Summary    Stacy Diaz Patient Status:  Inpatient    1955 MRN [id number]   Location [facility] 2W/SW Attending Rashel Barragan MD   Hosp Day # 1 PCP Kaitlynn Ramires MD     Date of Admission: 3/9/2 carotid stenosis. At that time the patient was referred for cardiac clearance in preparation for carotid surgery but did not follow up due to insurance reasons.  The patient recently was undergoing evaluation for a non healing heel ulcer and underwent a lef Refills:  0     Acidophilus/Pectin Caps  Commonly known as:  PROBIOTIC      Take 1 capsule by mouth daily. Refills:  0     aspirin 325 MG Tabs      Take 325 mg by mouth daily. Refills:  0     CA-MG-ZN OR      Take 1 tablet by mouth daily.    Refills:  0

## 2020-03-10 NOTE — PLAN OF CARE
IV Nitro weaned off after  morning medications. Up to bathroom  several times and ambulated in loyd. Denies any dizziness. Neuro intact. Explained discharge summary  including medications, incision care and follow up . Reviewed signs of TIA and stroke. Manage/alleviate anxiety  - Monitor for signs/symptoms of CO2 retention  Outcome: Completed     Problem: GENITOURINARY - ADULT  Goal: Absence of urinary retention  Description  INTERVENTIONS:  - Assess patient’s ability to void and empty bladder  - Monitor Lopressor  - Nitro gtt  - Hydralazine  - See additional Care Plan goals for specific interventions   Outcome: Completed

## 2020-03-10 NOTE — PROGRESS NOTES
[place] [facility]    Progress Note    Delroy Pod Patient Status:  Inpatient    1955 MRN [id number]   Location [facility] 2W/SW Attending Virgie Bal MD   Hosp Day # 1 PCP Smita Blank MD     Subjective:  Pt.  Sitting in understanding  Discharge planning; pt. Lives alone however his daughter will assist once discharged. Will make F/U appt. For pt. Written and verbal info provided to pt. Regarding recovery care including incision care. Tylenol for pain.      Plan of ca

## 2020-03-10 NOTE — PLAN OF CARE
S/p L CEA, ROHAN drain in place w/ minimal output (see I/O's), and dressing in place w/out drainage or hematoma, pain controlled w/ ice and po meds  RA-weaned tonight, denies SOB  NSR on tele, BP elevated - prn lopressor given w/out improvement, nitro gtt sta respiratory effort  - Oxygen supplementation based on oxygen saturation or ABGs  - Provide Smoking Cessation handout, if applicable  - Encourage broncho-pulmonary hygiene including cough, deep breathe, Incentive Spirometry  - Assess the need for suctioning Patient/Family Long Term Goal  Description  Patient's Long Term Goal: go home; Family goal-get patient to stop smoking    Interventions:  - discharge planning  - See additional Care Plan goals for specific interventions   Outcome: Progressing  Goal: Patibessy

## 2020-03-11 NOTE — PAYOR COMM NOTE
--------------  DISCHARGE REVIEW    Abel Lucia MA [facility]  Subscriber #:  [id number]  Authorization Number: [id number]    Admit date: 3/9/20  Admit time:  36  Discharge Date: 3/10/2020  1:06 PM     Admitting Physician: Moni Dan MD  Attending Phys /69 (BP Location: Right arm)   Pulse 82   Temp 97.6 °F (36.4 °C) (Temporal)   Resp (!) 32   Ht 5' 11\" (1.803 m)   Wt 208 lb (94.3 kg)   SpO2 97%   BMI 29.01 kg/m²      Gen:  NAD. A and O x  3  Left neck wound c/d/i  CV:   RRR.   No m/g/r  Pulm: how you take these medications      Instructions Prescription details   HYDROcodone-acetaminophen 5-325 MG Tabs  Commonly known as:  Roberto8 Judah Staley  What changed:  when to take this      Take 1 tablet by mouth every 8 (eight) hours as needed for Pain.    Quantity: Hospital Discharge Diagnoses: S/p left carotid endarterectomy    Lace+ Score: 50  59-90 High Risk  29-58 Medium Risk  0-28   Low Risk. TCM Follow-Up Recommendation:  LACE 29-58:  Moderate Risk of readmission after discharge from the hospital.

## 2020-03-17 PROBLEM — I62.9 INTRACRANIAL HEMORRHAGE (HCC): Status: ACTIVE | Noted: 2020-01-01

## 2020-03-17 PROBLEM — R56.9 SEIZURE (HCC): Status: ACTIVE | Noted: 2020-01-01

## 2020-03-17 NOTE — ED PROVIDER NOTES
Patient Seen in: [facility] AND [facility] Emergency Department      History   Patient presents with:  Altered Mental Status    Stated Complaint: ams    HPI    69-year-old male with history of hypertension, hypercholesterolemia, COPD, peripheral vascular disease but no defined injury noted. Respiratory: there are no retractions, lungs are clear to auscultation  Cardiovascular: regular rate and rhythm  Gastrointestinal:  abdomen is soft and non tender, no masses, bowel sounds normal  Neurological: Unresponsive.   Nita Carr Final result                 Please view results for these tests on the individual orders.    ABORH (BLOOD TYPE)   ANTIBODY SCREEN   RAINBOW DRAW BLUE   RAINBOW DRAW LAVENDER   RAINBOW DRAW LIGHT GREEN   RAINBOW DRAW GOLD     EKG    Rat hemorrhage [facility])    Disposition:  Admit  3/17/2020 12:54 pm    Follow-up:  No follow-up provider specified. Medications Prescribed:  There are no discharge medications for this patient.                  Present on Admission           ICD-10-CM Noted PO

## 2020-03-17 NOTE — H&P
[facility] Patient Status:  Inpatient    1955 MRN [id number]   Location [facility] 2W/SW Attending Mary Rojas DO   Hosp Day # 0 PCP No primary care provider on file.      Date o RRR, S1 S2  Respiratory: Slightly diminished expiratory breath sounds  GI: abdomen soft, non tender  Extremities: no clubbing, cyanosis, edema  Neurologic: Does not follow commands  Skin: warm, dry    Results:   Laboratory Data  Lab Results   Component Lily CERVICAL DISC LEVELS:  Multifocal areas of mild to moderate canal and foraminal narrowing secondary to degenerative facet, uncovertebral and disc disease. No high-grade canal or foraminal narrowing.          CONCLUSION:   Motion degraded exam.  Cyndi Santiago CT for further evaluation. 3. ET tube in satisfactory position.   Nasogastric tube extends into the stomach    Dictated by (CST): Ashley Chan MD on 3/17/2020 at 12:51 PM     Finalized by (CST): Ashley Chan MD on 3/17/2020 at 12:54 PM left frontal lobe with adjacent vasogenic edema. Findings may represent an underlying mass lesion with hemorrhage.   Acute intraparenchymal hemorrhage is also within the differential.  MRI of the brain with and without contrast can further characterize if

## 2020-03-17 NOTE — ED NOTES
Arrived via ems with nc in place, on bbrd, responding to painful stimuli only. Not protecting own airway, no gag. Preparing to intubate.

## 2020-03-17 NOTE — ED INITIAL ASSESSMENT (HPI)
FOUND ON BATHROOM FLOOR BY FAMILY THIS AM. LAST6 SEEN NORMAL AT 9PM LAST NIGHT. RESPONDING TO PAINFUL STIMULI ONLY.

## 2020-03-17 NOTE — RESPIRATORY THERAPY NOTE
Patient intubated on arrival to ED for airway protection and lethargy at 11:33 with size 7.5 ETT @ 24 cm at lip. Taken immediately to CT via ambu bag. Placed on ventilator post-CT transport on A/C 12/500/+5/100%. Was able to wean patient to 50%.  Patient th

## 2020-03-18 NOTE — CONSULTS
[facility]    Report of Consultation    Israel Cunningham Patient Status:  Inpatient    1955 MRN [id number]   Location [facility] 2W/SW Attending Jasper Rizo, [facility] [address] [place] [address] Day # 1 PCP No primary care provider on file.      Abelino Duffy Q4H PRN  0.9% NaCl infusion, , Intravenous, Continuous  carvedilol (COREG) tab 25 mg, 25 mg, Per G Tube, BID with meals  lisinopril tab 10 mg, 10 mg, Per NG Tube, Daily      lisinopril 10 MG Oral Tab, Take 10 mg by mouth daily.   carvedilol 25 MG Oral Tab, CT recommended to further evaluate when patient is clinically stable. Endotracheal tube partially seen.    Dictated by (CST): Amilcar Martinez MD on 3/17/2020 at 12:03 PM     Finalized by (CST): Amilcar Martinez MD on 3/17/2020 at 12:08 PM          Xr Chest Ap [facility])    Intracranial hemorrhage ([facility] [address]    Recommendations:  Mr. Yessica Griffiths was found unresponsive and had a witnessed seizure. He is currently intubated. We recommend that he remain on Keppra.   We also recommend that he have an MRI of the brain with and wi

## 2020-03-18 NOTE — PLAN OF CARE
Sedation vacation done this AM, see previous note. Restraints in place to protect lines and ETT. Turning patient q 2 hours with pillows for support. Munoz care provided. Mepilex to sacral area. Oral care provided. Heel boots in place. Scd on.  IVF infusin post seizure  - Seizure pads on all 4 side rails  - Instruct patient/family to notify RN of any seizure activity  - Instruct patient/family to call for assistance with activity based on assessment  Outcome: Progressing     Problem: Diabetes/Glucose Control Progressing     Problem: Patient/Family Goals  Goal: Patient/Family Long Term Goal  Description  Patient's Long Term Goal: GLENYS 3/18    Interventions:  - GLENYS  - See additional Care Plan goals for specific interventions  Outcome: Not Progressing  Goal: Patie broncho-pulmonary hygiene including cough, deep breathe, Incentive Spirometry  - Assess the need for suctioning and perform as needed  - Assess and instruct to report SOB or any respiratory difficulty  - Respiratory Therapy support as indicated  - Manage/a mobility and gait  - Educate and engage patient/family in tolerated activity level and precautions    Outcome: Not Progressing     Problem: Delirium  Goal: Minimize duration of delirium  Description  Interventions:  - Encourage use of hearing aids, eye gla

## 2020-03-18 NOTE — RESPIRATORY THERAPY NOTE
Patient received on full support A/C 12 500 40% +5. Et tube 7.5 taped 25 at the lip. Patient resting comfortably throughout the night.

## 2020-03-18 NOTE — PLAN OF CARE
Patient put on sedation vacation. Withdraws to pain in left arm, left leg and right leg. No movement noted to right arm. nonpurposeful movement noted to left arm. Will not follow commands. Pupils equal but sluggish.  Off sedation, patient's RR in the 50-60'

## 2020-03-18 NOTE — PROGRESS NOTES
[place] [facility] - [facility]     Progress Note        Mandeep Vyas Patient Status:  Inpatient    1955 MRN [id number]   Location [facility] 2W/SW Attending Carol Crooks, [address] [place] [address] Day # 1 PCP No primary care provider on file.        Subj dry      Results:     Lab Results   Component Value Date    WBC 16.0 03/18/2020    HGB 13.3 03/18/2020    HCT 40.7 03/18/2020    .0 03/18/2020    CREATSERUM 0.70 03/18/2020    BUN 11 03/18/2020     03/18/2020    K 3.0 03/18/2020     03/1 bibasilar pulmonary markings. No acute pulmonary consolidation. 2. Small indeterminate 11.5 mm right apical lesion. Recommend nonurgent chest CT for further evaluation. 3. ET tube in satisfactory position.   Nasogastric tube extends into the stomach underlying mass lesion with hemorrhage. MRI brain recommended for further evaluation once stable to better differentiate between hemorrhage versus tumor with some hemorrhagic conversion seen.  -Neurosurgery recommendations appreciated.   -Close neuro monit

## 2020-03-18 NOTE — CM/SW NOTE
SW received MDO for advanced directives. Pt is currently on ventilator support, non-responsive and unable to participate in completion of POA-HC. SW/CM to follow up with patient when appropriate.        SW/CM to remain available for support and/or disch

## 2020-03-18 NOTE — CONSULTS
Consult dictated. 79-year-old gentleman evaluated earlier today presented yesterday through the emergency room with altered mental status. Spoke with Dr. Stanley Calvillo last night who requested the consult.   Left frontal lobe hemorrhagic lesion with subsequent se

## 2020-03-18 NOTE — PAYOR COMM NOTE
--------------  ADMISSION REVIEW     Tab Bryson MA [facility]  Subscriber #:  [id number]  Authorization Number: [id number]    Admit date: 3/17/20  Admit time: 1422       Admitting Physician: Anton Clarke DO  Attending Physician:  Anton Clarke DO  P Exam     ED Triage Vitals   BP 03/17/20 1125 (!) 156/138   Pulse 03/17/20 1125 91   Resp 03/17/20 1125 14   Temp 03/17/20 1134 97.9 °F (36.6 °C)   Temp src 03/17/20 1134 Temporal   SpO2 03/17/20 1125 97 %   O2 Device 03/17/20 1125 Nasal cannula       Markus ---------                               -----------         ------                     CBC W/ DIFFERENTIAL[[id number]]          Abnormal            Final result                 Please view results for these tests on the individual orders.    TYPE AND SCREE myself. CT results noted. Suspect seizure as a cause of the patient's presentation on arrival.  Will give Keppra in the ED. Discussed with Dr. Joel Edwards, neurosurgery.   Also discussed with Dr. Abhishek Sosa, intensivist.    I spent a total of 40 minutes of criti evidence of blood appreciated within oral cavity. Shortly after arrival to emergency department, patient with evidence of seizure-like activity and Ativan administered. Patient intubated. Highest recorded blood pressure 218/94.   Patient does not follow (cpt=72125)    Result Date: 3/17/2020  PROCEDURE: CT SPINE CERVICAL (CPT=72125)  COMPARISON: None.   INDICATIONS: Patient was found on bethroom floor unresponsive is in c collar and intubated  TECHNIQUE:   Multi-planar CT images were obtained without intrav imaging is recommended to demonstrate stability. If none available, followup Non-emergent chest CT recommended to further evaluate when patient is clinically stable. Endotracheal tube partially seen.    Dictated by (CST): Tripp Holman MD on 3/17/2020 at 15 Dose information is transmitted to the  [facility]) NRDR ([address]) which includes the Dose Index Registry.   FINDINGS:  PARENCHYMA:  Diffuse low attenuation is seen within the bilateral periventricular white Finalized by (CST): Nadia Zabala MD on 3/17/2020 at 12:00 PM            Assessment   1. Acute encephalopathy  2. Left frontal lobe brain lesion  3. Acute respiratory failure  4. Hypertensive emergency  5. COPD  6. Peripheral vascular disease  7.   Rec chloride (KLOR-CON) powder packet 40 mEq     Date Action Dose Route User    3/18/2020 1348 Given 40 mEq Per NG Tube Ilana Arce RN    3/18/2020 1130 Given 40 mEq Per NG Tube Matt Iyer, RN      propofol (DIPRIVAN) infusion     Date Action from the chart. Per his chart, he had a carotid endartectomy on March 9, 2020. He was found unresponsive in his bathroom at home yesterday. Shortly after arrival in the emergency department, Mr. Jasmyne Song had witnessed seizure-like activity.   He was intu seen.   Dictated by (CST): Ana Paula Ruvalcaba MD on 3/17/2020 at 12:03 PM     Finalized by (CST): Ana Paula Ruvalcaba MD on 3/17/2020 at 12:08 PM           Xr Chest Ap Portable  (cpt=71045)     Result Date: 3/17/2020  CONCLUSION:  1. ET tube tip about 6 cm above the ca and had a witnessed seizure. He is currently intubated. We recommend that he remain on Keppra. We also recommend that he have an MRI of the brain with and without contrast when he is able.   We will review his MRI films when these are available and make parameters (last 24 hours):       Scheduled Meds: potassium chloride (KLOR-CON) powder packet 40 mEq, 40 mEq, Per NG Tube, Q4H  propofol (DIPRIVAN) infusion, 5-100 mcg/kg/min, Intravenous, Continuous  levETIRAcetam (KEPPRA) 500 mg in sodium chloride 0.9% 1 activity. Evidence of seizure activity earlier during ER course.   Continue Keppra at this time.  -Intubated for airway protection.  Full ventilatory support at this time.  Chest x-ray with no acute intrathoracic findings seen.  -Continue antihypertensives

## 2020-03-18 NOTE — DIETARY NOTE
ADULT NUTRITION INITIAL ASSESSMENT    Pt is at moderate nutrition risk. Pt does not meet malnutrition criteria. RECOMMENDATIONS TO MD:  CPM Potential tube feeding tomorrow if not extubated and safe to begin.       NUTRITION DIAGNOSIS/PROBLEM:  Norma Tobar 10 Encounters:  03/18/20 : 82.6 kg (182 lb)    GASTROINTESTINAL: OG tube in place. to suction: bile output (600 ml total 3/17).   + BM 3/17  FOOD/NUTRITION RELATED HISTORY:  Appetite: Good  Intake: NPO  Intake Meeting Needs: No  Food Allergies: No  Cultural

## 2020-03-18 NOTE — PLAN OF CARE
Problem: Safety Risk - Non-Violent Restraints  Goal: Patient will remain free from self-harm  Description  INTERVENTIONS:  - Apply the least restrictive restraint to prevent harm  - Notify patient and family of reasons restraints applied  - Assess for an hemodynamic stability  - Monitor arterial and/or venous puncture sites for bleeding and/or hematoma  - Assess quality of pulses, skin color and temperature  - Assess for signs of decreased coronary artery perfusion - ex.  Angina  - Evaluate fluid balance, a activity  - Administer anti-seizure medications as ordered  - Monitor neurological status  Outcome: Progressing  Goal: Remains free of injury related to seizure activity  Description  INTERVENTIONS:  - Maintain airway, patient safety  and administer oxygen

## 2020-03-19 NOTE — PROGRESS NOTES
[facility] [facility]     Progress Note        Jossue Crumbly Patient Status:  Inpatient    1955 MRN [id number]   Location [facility] 2W/SW Attending [address] [facility], [address] [facility] [address] Day # 2 PCP No primary care provider on file.        Subj BUN 14 03/19/2020     03/19/2020    K 3.7 03/19/2020     03/19/2020    CO2 28.0 03/19/2020    GLU 92 03/19/2020    CA 8.5 03/19/2020    MG 1.9 03/19/2020       Ct Brain Or Head (59945)    Result Date: 3/19/2020  CONCLUSION:   Persistent small f (CST): Soto Osei MD on 3/19/2020 at 7:38 AM     Finalized by (CST): Soto Osei MD on 3/19/2020 at 7:43 AM          Xr Chest Ap Portable  (cpt=71045)    Result Date: 3/18/2020  CONCLUSION: Morphologic changes of COPD/emphysema.   Stable posi emergency department and discussed with Dr. Coral Bui.      Dictated by (CST): Mariajose Alex MD on 3/17/2020 at 11:52 AM     Finalized by (CST): Mariajose Alex MD on 3/17/2020 at 12:00 PM            Ekg 12-lead    Result Date: 3/17/2020  ECG Report  Inter

## 2020-03-19 NOTE — PROGRESS NOTES
[place] [facility] - [facility]    Progress Note    Christina Courtneyobey Patient Status:  Inpatient    1955 MRN [id number]   Location [facility] 2W/SW Attending [address] [facility], [address] Day # 2 PCP No primary care provider on file.        Subjectiv frontal lobe with mild adjacent vasogenic edema. Overall appearance and size has not significantly changed since 3/7/2020. Intraparenchymal hemorrhage versus a hemorrhagic mass remain the leading differentials.   When patient is clinically stable, MRI of

## 2020-03-19 NOTE — RESPIRATORY THERAPY NOTE
RESPIRATORY THERAPY PATIENT TRANSPORT NOTE    Transported from: 65  Transported to: [facility]    Patient is intubated?:yes  Transport ventilator used? :yes  Resuscitation bag used during transport?:no   ETT placement and ventilator function were verified post-tr

## 2020-03-19 NOTE — PROGRESS NOTES
RESPIRATORY THERAPY MECHANICAL VENTILATION PROGRESS NOTE    Ventilator Weaning:  Patient meets criteria for weaning? yes Weaning was attempted yes using pressure support 5 cmH2O + PEEP 5 cmH2O. The patient tolerated well for 30 minutes.   Current Ventilator

## 2020-03-19 NOTE — CONSULTS
[facility]    PATIENT'S NAME: Amaya Aggarwal   ATTENDING PHYSICIAN: Magaly Nguyen.  Cecil Sharps PHYSICIAN: Cheyenne Roach MD   PATIENT ACCOUNT#:   [id number]    [facility]  [facility] RECORD #:   [id number]       DATE OF BIR neoplastic workup. If this is ischemic stroke with hemorrhagic conversion, will need carotid ultrasounds, 2D echo, lipid panel. May benefit from rehab. Continue Keppra. Check a trough Keppra level in 5 days. No need for EEG.   Would not change treatm

## 2020-03-19 NOTE — RESPIRATORY THERAPY NOTE
Pt received on full vent support: AC 12/500/+5/40%. BS auscultated bilaterally. Minimal, thick secretions suctioned. RT to continue to monitor.

## 2020-03-19 NOTE — PLAN OF CARE
Problem: CARDIOVASCULAR - ADULT  Goal: Maintains optimal cardiac output and hemodynamic stability  Description  INTERVENTIONS:  - Monitor vital signs, rhythm, and trends  - Monitor for bleeding, hypotension and signs of decreased cardiac output  - Evalua hydration with IV or PO as ordered and tolerated  - Evaluate effectiveness of GI medications  - Encourage mobilization and activity  - Obtain nutritional consult as needed  - Establish a toileting routine/schedule  - Consider collaborating with pharmacy to devices  Outcome: Progressing     Problem: Diabetes/Glucose Control  Goal: Glucose maintained within prescribed range  Description  INTERVENTIONS:  - Monitor Blood Glucose as ordered  - Assess for signs and symptoms of hyperglycemia and hypoglycemia  - Adm

## 2020-03-20 NOTE — PROGRESS NOTES
[facility] Pharmacy Note: Route Optimization for Levetiracetam (KEPPRA)    Patient is currently on Levetiracetam (KEPPRA) 500 mg IV every 12 hours.    The patient meets the criteria to convert to the oral equivalent as established by the IV to Oral conversion prot

## 2020-03-20 NOTE — DIETARY NOTE
NUTRITION NOTE UPDATE:    Received consult to initiate tube feeds. Pt remains intubated and sedated on propofol. Day #3 NPO     Pt is at high  nutrition risk with tube feed start.       ESTIMATED NUTRITION NEEDS:  Calories: 2160  calorie

## 2020-03-20 NOTE — PROGRESS NOTES
[place] [facility] - [facility]     Progress Note        Noemy Quiet Patient Status:  Inpatient    1955 MRN [id number]   Location [facility] 2W/ Attending [address] [facility], [address] Day # 3 PCP No primary care provider on file.        Subj Results   Component Value Date    WBC 15.0 03/20/2020    HGB 12.9 03/20/2020    HCT 39.1 03/20/2020    .0 03/20/2020    CREATSERUM 0.50 03/20/2020    BUN 13 03/20/2020     03/20/2020    K 3.6 03/20/2020     03/20/2020    CO2 25.0 03/20/2 Chest Ap Portable  (cpt=71045)    Result Date: 3/20/2020  CONCLUSION:  1. Partial clearing of the infiltrate at the right base when compared to March 19, 2020. 2. Endotracheal tube in good position. 3. Nasogastric tube. Dictated by (CST):  Esperanza Alexandre

## 2020-03-20 NOTE — PLAN OF CARE
Problem: Safety Risk - Non-Violent Restraints  Goal: Patient will remain free from self-harm  Description  INTERVENTIONS:  - Apply the least restrictive restraint to prevent harm  - Notify patient and family of reasons restraints applied  - Assess for an intramuscular injections, enemas and rectal medication administration  - Ensure safe mobilization of patient  - Hold pressure on venipuncture sites to achieve adequate hemostasis  - Assess for signs and symptoms of internal bleeding  - Monitor lab trends hygiene including cough, deep breathe, Incentive Spirometry  - Assess the need for suctioning and perform as needed  - Assess and instruct to report SOB or any respiratory difficulty  - Respiratory Therapy support as indicated  - Manage/alleviate anxiety airway, patient safety  and administer oxygen as ordered  - Monitor patient for seizure activity, document and report duration and description of seizure to MD/LIP  - If seizure occurs, turn patient to side and suction secretions as needed  - Reorient beto Promote sleep, encourage patient's normal rest cycle i.e. lights off, TV off, minimize noise and interruptions  - Encourage family to assist in orientation and promotion of home routines  Outcome: Progressing     Patient remains intubated and sedated.  Marval Hose

## 2020-03-20 NOTE — PLAN OF CARE
Nodding head to questions and following simple commands. Started on Plavix and Low dose Aspirin via OG   Tolerated CPAP for two hours and passed Respiratory parameters. Continues to have thick yellow secretions. ETT trap sample collected for culture.   Low Patient/Family Goals  Goal: Patient/Family Long Term Goal  Description  Patient's Long Term Goal: GLENYS 3/18    Interventions:  - GLENYS  - See additional Care Plan goals for specific interventions  Outcome: Progressing  Goal: Patient/Family Short Term Goal  Kimberly Weber as ordered  - Initiate emergency measures for life threatening arrhythmias  - Monitor electrolytes and administer replacement therapy as ordered  Outcome: Progressing     Problem: RESPIRATORY - ADULT  Goal: Achieves optimal ventilation and oxygenation  Toño measures to prevent increased intracranial pressure  - Maintain blood pressure and fluid volume within ordered parameters to optimize cerebral perfusion and minimize risk of hemorrhage  - Monitor temperature, glucose, and sodium.  Initiate appropriate inter Glucose as ordered  - Assess for signs and symptoms of hyperglycemia and hypoglycemia  - Administer ordered medications to maintain glucose within target range  - Assess barriers to adequate nutritional intake and initiate nutrition consult as needed  - In

## 2020-03-20 NOTE — PROGRESS NOTES
MRI of the brain report reviewed. When patient is extubated would proceed with carotid ultrasounds 2D echo. Acute infarct is probably small vessel disease with his known risk factors.   When able to swallow, 81 mg aspirin, 75 mg of Plavix for 21 days and

## 2020-03-20 NOTE — RESPIRATORY THERAPY NOTE
Pt received on mechanical ventilator settings AC 12,500,40%+5. MRI done this shift. Bilateral breathsounds. Pt tolerating well. RN will continue to monitor. [email or web address] Information

## 2020-03-20 NOTE — PROGRESS NOTES
[facility]    Progress Note    Tab Dominguez Patient Status:  Inpatient    1955 MRN [id number]   Location [facility] 2W/ Attending Lucinda Gonzales, [address] [place] [address] Day # 3 PCP No primary care provider on file.        Subjectiv  03/20/2020    CO2 25.0 03/20/2020    GLU 91 03/20/2020    CA 8.5 03/20/2020    MG 1.9 03/19/2020    TROP <0.045 03/17/2020       Ct Brain Or Head (34903)    Result Date: 3/19/2020  CONCLUSION:   Persistent small focus of hyperdensity is once again s Endotracheal tube in good position. 3. Nasogastric tube. Dictated by (CST): Sonal Harman MD on 3/20/2020 at 9:41 AM     Finalized by (CST):  Sonal Harman MD on 3/20/2020 at 9:44 AM          Xr Chest Ap Portable  (cpt=71045)    Result Date: 3/1

## 2020-03-20 NOTE — PROGRESS NOTES
RESPIRATORY THERAPY MECHANICAL VENTILATION PROGRESS NOTE    Ventilator Weaning:  Patient meets criteria for weaning? yes Weaning was attempted yes using pressure support 5 cmH2O + PEEP 5 cmH2O. The patient tolerated well for 1.5 hours.  The patient was retu

## 2020-03-20 NOTE — PROGRESS NOTES
[place] [facility]    Neurosurgery Progress Note    Chandrika Warddayan Patient Status:  Inpatient    1955 MRN [id number]   Location [facility] 2W/SW Attending Cherise Blanca, [facility] Day # 3 PCP No primary care provider on file. seen within the left frontal lobe with mild adjacent vasogenic edema. Overall appearance and size has not significantly changed since 3/7/2020. Intraparenchymal hemorrhage versus a hemorrhagic mass remain the leading differentials.   When patient is clini addressed. All questions and concerns were addressed. We appreciate the opportunity to participate in the care of this patient. Please do not hesitate to call our office ([phone]) with any issues.     Daniel Davies  3/20/2020  7:15 AM

## 2020-03-21 NOTE — PROGRESS NOTES
[facility]    Progress Note      Assessment and Plan:   1. 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 Recommendations: Ongoing Keppra, as per neurology, to follow clinically.     2.  Respiratory impairment–patient has underlying CO

## 2020-03-21 NOTE — PLAN OF CARE
Pt intubated and sedated, following commands. TF advanced to 45 cc/hr with no residuals. Vent settings below. Still has copious amounts of oral and inline secretions. Bilateral soft wrist restraints in place with frequent roundings.  Repositioned per protoc 3/18    Interventions:  - GLENYS  - See additional Care Plan goals for specific interventions  Outcome: Progressing  Goal: Patient/Family Short Term Goal  Description  Patient's Short Term Goal: daughter - states get extubated    Interventions:   - GLENYS  - See administer replacement therapy as ordered  Outcome: Progressing     Problem: RESPIRATORY - ADULT  Goal: Achieves optimal ventilation and oxygenation  Description  INTERVENTIONS:  - Assess for changes in respiratory status  - Assess for changes in mentation ordered parameters to optimize cerebral perfusion and minimize risk of hemorrhage  - Monitor temperature, glucose, and sodium.  Initiate appropriate interventions as ordered  Outcome: Progressing  Goal: Absence of seizures  Description  INTERVENTIONS  - Mon hyperglycemia and hypoglycemia  - Administer ordered medications to maintain glucose within target range  - Assess barriers to adequate nutritional intake and initiate nutrition consult as needed  - Instruct patient on self management of diabetes  Outcome:

## 2020-03-21 NOTE — RESPIRATORY THERAPY NOTE
Pt received on full vent support: AC 12/500/+5/40%. BS auscultated bilaterally. Suction provided as indicated. No changes made overnight. RT to continue to monitor.

## 2020-03-21 NOTE — PLAN OF CARE
Pt extubated/OG tube removed on this shift. Up to chair well tolerated for about 5 hrs. Used 2A to get pt to chair and stand lift to get back to bed. Aox4. R side drift, ataxia and weakness noted on assessments with Dr. James 22 at bedside.   Pt on Plavi care for you? I live at home with my daughter. Please keep her updated.   - Provide timely, complete, and accurate information to patient/family  - Incorporate patient and family knowledge, values, beliefs, and cultural backgrounds into the planning and del Assess quality of pulses, skin color and temperature  - Assess for signs of decreased coronary artery perfusion - ex.  Angina  - Evaluate fluid balance, assess for edema, trend weights  Outcome: Progressing  Goal: Absence of cardiac arrhythmias or at baseli intact  Description  INTERVENTIONS  - Assess and document risk factors for pressure ulcer development  - Assess and document skin integrity  - Monitor for areas of redness and/or skin breakdown  - Initiate interventions, skin care algorithm/standards of ca Problem: Impaired Functional Mobility  Goal: Achieve highest/safest level of mobility/gait  Description  Interventions:  - Assess patient's functional ability and stability  - Promote increasing activity/tolerance for mobility and gait  - Educate and eng

## 2020-03-21 NOTE — SLP NOTE
ADULT SWALLOWING EVALUATION    ASSESSMENT    ASSESSMENT/OVERALL IMPRESSION:  SLP BSSE orders received and acknowledged.  A swallow evaluation warranted as pt with prolonged intubation with extubation this AM. Pt with hoarse vocal quality, on 5L/Min, with ox spoon; Slow rate; Alternate consistencies;Small bites and sips;Multiple swallows  Aspiration Precautions: Upright position; Slow rate;Small bites and sips; No straw  Medication Administration Recommendations: Crushed in puree  Treatment Plan/Recommendations: C Silent aspiration cannot be evaluated clinically.  Videofluoroscopic Swallow Study is required to rule-out silent aspiration.)    Esophageal Phase of Swallow: No complaints consistent with possible esophageal involvement    GOALS  Goal #1 The patient will t

## 2020-03-21 NOTE — PROGRESS NOTES
Neurology Inpatient Follow-up Note      HPI:     Patient being seen in follow up. Interval notes and workup reviewed. Patient extubated, appears to be doing better.       Past Medical Hisotory:  Reviewed    Medications:  Reviewed    Allergies:  No Known matter ischemic changes in the left basal ganglionic region and centrum semiovale. 5. Small 6 mm left frontal subcortical cavernoma suspected with subtle enhancement accounting for high attenuation focus on recent CT exam.  No significant vasogenic edema.

## 2020-03-22 NOTE — OCCUPATIONAL THERAPY NOTE
OCCUPATIONAL THERAPY EVALUATION - INPATIENT     Room Number: 224/224-A  Evaluation Date: 3/22/2020  Type of Evaluation: Initial  Presenting Problem: (CVA)    Physician Order: IP Consult to Occupational Therapy  Reason for Therapy: ADL/IADL Dysfunction and training; Endurance training;Patient/Family education;Patient/Family training;Equipment eval/education       OCCUPATIONAL THERAPY MEDICAL/SOCIAL HISTORY     Problem List  Principal Problem:    Seizure [facility])  Active Problems:    Intracranial hemorrhage ([facility] [address] washing, rinsing, drying)?: A Lot  -   Toileting, which includes using toilet, bedpan or urinal? : Total  -   Putting on and taking off regular upper body clothing?: A Lot  -   Taking care of personal grooming such as brushing teeth?: A Lot  -   Eating elizabeth

## 2020-03-22 NOTE — SLP NOTE
SPEECH/LANGUAGE/COGNITIVE EVALUATION - INPATIENT    Admission Date: 3/17/2020  Evaluation Date: 03/22/20    Reason for Referral: intracranial hemorrhage    ASSESSMENT & PLAN   ASSESSMENT & IMPRESSION      Pt with imprecise consonant production.  Pt was asse session(s). In Progress   Goal #2 Pt to complete verbal agility drills of diadochokinetic exercises with 90% accuracy within 4-5 sessions.       In Progress   Goal #3 Pt to use trained speech compensatory strategies (slow rate, exaggerated articulation, pa Yes  SLP Follow-up Date: 03/23/20    Thank you for your referral.  If you have any questions please contact     Jessee Teresa M.S. CCC/SLP  Speech-Language Pathologist  [facility]  #[phone]

## 2020-03-22 NOTE — PHYSICAL THERAPY NOTE
PHYSICAL THERAPY EVALUATION - INPATIENT     Room Number: 224/224-A  Evaluation Date: 3/22/2020  Type of Evaluation: Initial   Physician Order: PT Eval and Treat    Presenting Problem: Seizure, unresponsive  Reason for Therapy: Mobility Dysfunction and Dis training;Strengthening;Transfer training;Balance training  Rehab Potential : Good  Frequency (Obs): 5x/week       PHYSICAL THERAPY MEDICAL/SOCIAL HISTORY     History related to current admission: Patient with hx of PVD, COPD, HTN, carotid endarterectomy on Sitting: Fair -  Dynamic Sitting: Poor +  Static Standing: Poor +  Dynamic Standing: Poor    ADDITIONAL TESTS  Additional Tests: None                                 NEUROLOGICAL FINDINGS  Neurological Findings: None                   ACTIVITY TOLERANCE Status    Goal #2 Patient is able to demonstrate transfers Sit to/from Stand at assistance level: supervision with walker - rolling     Goal #2  Current Status    Goal #3 Patient is able to ambulate 50 feet with assist device: walker - rolling at assistanc

## 2020-03-22 NOTE — SLP NOTE
SPEECH DAILY NOTE - INPATIENT    ASSESSMENT & PLAN   ASSESSMENT      Pt alert, afebrile and on room air. Pt seen for swallow analysis per BSE recommendations (after consulting with RN). Pt reported odynophagia level 5-7/10.  Vocal quality weak/hoarse and at Recommendations - Solids: Puree  Diet Recommendations - Liquid: Nectar thick    Compensatory Strategies Recommended: No straws; Liquids via spoon; Slow rate; Alternate consistencies;Small bites and sips;Multiple swallows  Aspiration Precautions: Upright posit bites, Small sips, Multiple swallows, Alternate liquids/solids, No straws, Upright 90 degrees, Liquids via tsp amount only, Feed patient with complete assistance 100 % of the time across 3 sessions. Pt seen upright in bed self-feeding oral trials.  Madi

## 2020-03-22 NOTE — PROGRESS NOTES
Neurology Inpatient Follow-up Note      HPI:     Patient being seen in follow up. Interval notes and workup reviewed. Appears to be doing a little better.       Past Medical Hisotory:  Reviewed    Medications:  Reviewed    Allergies:  No Known Allergies Cavernoma, incidental  Small left parietal lacunar stroke, acute  Chronic left parietal and right parietal encephalomalacia, presumably ischemic in nature  Seizure  I suspect patient had focal onset seizure with secondary generalization, in the context o

## 2020-03-22 NOTE — PROGRESS NOTES
[facility]    Progress Note      Assessment and Plan:   1. 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 Recommendations: Ongoing Keppra, as per neurology, to follow clinically.     2.  Respiratory impairment–patient has underlying CO CREATSERUM 0.50 03/22/2020    BUN 10 03/22/2020     03/22/2020    K 3.6 03/22/2020     03/22/2020    CO2 27.0 03/22/2020    GLU 94 03/22/2020    CA 8.1 03/22/2020    MG 1.7 03/22/2020     Chest x-ray– mild right perihilar infiltrate.   1-1/2 cm

## 2020-03-22 NOTE — PROGRESS NOTES
[facility] Dosing Service  Antibiotic Dosing    Tab Dominguez is a 71year old male for whom pharmacy is dosing Zosyn for treatment of  pneumonia. Allergies: has No Known Allergies.     Vitals: /58 (BP Location: Right arm)   Pulse 78   Temp 99

## 2020-03-22 NOTE — PLAN OF CARE
Problem: Safety Risk - Non-Violent Restraints  Goal: Patient will remain free from self-harm  Description  INTERVENTIONS:  - Apply the least restrictive restraint to prevent harm  - Notify patient and family of reasons restraints applied  - Assess for an mobilization of patient  - Hold pressure on venipuncture sites to achieve adequate hemostasis  - Assess for signs and symptoms of internal bleeding  - Monitor lab trends  - Patient is to report abnormal signs of bleeding to staff  - Avoid use of toothpicks suctioning and perform as needed  - Assess and instruct to report SOB or any respiratory difficulty  - Respiratory Therapy support as indicated  - Manage/alleviate anxiety  - Monitor for signs/symptoms of CO2 retention  Outcome: Progressing     Problem: GA activity, document and report duration and description of seizure to MD/LIP  - If seizure occurs, turn patient to side and suction secretions as needed  - Reorient patient post seizure  - Seizure pads on all 4 side rails  - Instruct patient/family to notif

## 2020-03-22 NOTE — PLAN OF CARE
O2 adequate on 3L this morning; weaned down to RA. Up to chair 2A stand pivot tolerated for 2 hours then back to bed after working with PT. Had large BM today. Aox4. VSS. Taking Plavix/ Low dose Aspirin started on subq Heparin.   Continues to have mode Goal: daughter - states get extubated    Interventions:   - GLENYS  - See additional Care Plan goals for specific interventions   Outcome: Progressing     Problem: HEMATOLOGIC - ADULT  Goal: Free from bleeding injury  Description  (Example usage: patient with Assess for changes in respiratory status  - Assess for changes in mentation and behavior  - Position to facilitate oxygenation and minimize respiratory effort  - Oxygen supplementation based on oxygen saturation or ABGs  - Provide Smoking Cessation handout Progressing  Goal: Absence of seizures  Description  INTERVENTIONS  - Monitor for seizure activity  - Administer anti-seizure medications as ordered  - Monitor neurological status  Outcome: Progressing  Goal: Remains free of injury related to seizure activ patient's normal rest cycle i.e. lights off, TV off, minimize noise and interruptions  - Encourage family to assist in orientation and promotion of home routines  Outcome: Progressing

## 2020-03-23 NOTE — PLAN OF CARE
Patient received awake and alert, oriented x 4. VSS. RA. Patient using Demi Danny independently to suction oral secretions. Voiding freely. Iv fluids. Iv zosyn. Tolerating pureed/nectar thick diet without complications. Will continue to monitor.     Problem: blowing  Outcome: Progressing     Problem: CARDIOVASCULAR - ADULT  Goal: Maintains optimal cardiac output and hemodynamic stability  Description  INTERVENTIONS:  - Monitor vital signs, rhythm, and trends  - Monitor for bleeding, hypotension and signs of de function  - Maintain adequate hydration with IV or PO as ordered and tolerated  - Evaluate effectiveness of GI medications  - Encourage mobilization and activity  - Obtain nutritional consult as needed  - Establish a toileting routine/schedule  - Consider Progressing  Goal: Achieves maximal functionality and self care  Description  INTERVENTIONS  - Monitor swallowing and airway patency with patient fatigue and changes in neurological status  - Encourage and assist patient to increase activity and self care

## 2020-03-23 NOTE — OCCUPATIONAL THERAPY NOTE
OCCUPATIONAL THERAPY TREATMENT NOTE - INPATIENT        Room Number: 511/438-Z           Presenting Problem: (CVA)    Problem List  Principal Problem:    Seizure ([address]  Active Problems:    Intracranial hemorrhage ([address]      OCCUPATIONAL THERAPY ASSESSMENT clothing?: A Lot  -   Bathing (including washing, rinsing, drying)?: A Lot  -   Toileting, which includes using toilet, bedpan or urinal? : A Lot  -   Putting on and taking off regular upper body clothing?: A Lot  -   Taking care of personal grooming such

## 2020-03-23 NOTE — SLP NOTE
SPEECH DAILY NOTE - INPATIENT    ASSESSMENT & PLAN   ASSESSMENT    SWALLOW THERAPY  Pt seen for swallowing therapy to monitor swallowing tolerance and train swallowing precautions per BSE recommendations.   Collaborated with RN, whom reports pt is toleratin precautions x2 at a meal, as able. VFSS may be needed prior to upgrade to thin liquids. Speech to continue to monitor need for VFSS as lingual movements improve and pain decreases. CXR  3/22/20:  CONCLUSION:   1.  Right perihilar nodular opacity, [place] [address]  Pain Location: tongue  Pain Control: PO meds  Nursing Aware of Pain?: Yes    Discharge Recommendations  Discharge Recommendations/Plan: Undetermined    Treatment Plan  Treatment Plan/Recommendations: Communication evaluation;Aspiration precautions    In 100 % of the time across 3 sessions. Pt educated on swallowing precautions. The pt self fed trials from his breakfast tray. Mild cues required for slow rate, controlled amounts (liquids), and multiple swallows.   The pt with improved use of alternatin cognitive impairment GOAL MET   Goal # 6 Pt will complete problem solving tasks for daily activities with minimal cues with 90% accuracy within 3 sessions.   In Progress   Goal #7 The pt will retain/recall a list of 5 functional words from his daily activit

## 2020-03-23 NOTE — CM/SW NOTE
Received MDO for JOSETTE gould. Per chart review: PT/OT recommend SNF at d/c.    SW met w/ pt in his room. Pt verified his address and confirmed living alone in an apartment building w/ an elevator.  Pt reports that he has plans to move in w/ his dtr, Serenity Mast, w/in

## 2020-03-23 NOTE — DIETARY NOTE
ADULT NUTRITION REASSESSMENT     Pt is at moderate nutrition risk. Pt does not meet malnutrition criteria.       RECOMMENDATIONS TO MD:  CPM   RD added Oral Nutritional Supplements TID for pt to trial to maximize Nutrition     NUTRITION DIAGNOSIS/PROBL BMI: Body mass index is 25.12 kg/m².   BMI CLASSIFICATION: 25-29.9 kg/m2 - overweight  IBW: 172 lbs        106 % IBW  Usual Body Wt: n/a  lbs       Unknown. % UBW      WEIGHT HISTORY:  Patient Weight(s) for the past 336 hrs:   Weight   03/23/20 0622 81.7 FINDINGS:  - Body Fat/Muscle Mass: well nourished per visual exam.  - Fluid Accumulation: generalized edema    - Skin Integrity: breakdown and L heel ulcer stage II.     NUTRITION PRESCRIPTION:  Diet: Cardiac, Pureed and Nectar Thick Liquids  Oral Supplemen

## 2020-03-23 NOTE — PAYOR COMM NOTE
--------------  ADMISSION REVIEW     Roxie Sorenson MA [facility]  Subscriber #:  [id number]  Authorization Number: [id number]    Admit date: 3/9/20  Admit time: 36       Admitting Physician: Clive Ramos MD  Attending Physician:  No att. providers found  P Incision was made on the left side anterior to the sternocleidomastoid muscle approximately 7 cm in length. The internal jugular vein was retracted laterally and the branches were ligated and divided to expose the carotid sheath.  The hypoglossal and vagus 1/4 % Marcaine with Epi was infiltrated into the wound for post op analgesia. A Lalla Mati drain was placed through a separate stab wound. The wound was closed in layers after thorough irrigation with antibiotic irrigation. A dressing was applied.   Georgette Amor

## 2020-03-23 NOTE — PROGRESS NOTES
Neurology Inpatient Follow-up Note      HPI:     Patient being seen in follow up. Interval notes and workup reviewed. Patient appears to be a little bit brighter, improved today.       Past Medical Hisotory:  Reviewed    Medications:  Reviewed    Allerg comparison.     ASSESSMENT/PLAN:   Cavernoma, incidental  Small left parietal lacunar stroke, acute  Chronic left parietal and right parietal encephalomalacia, presumably ischemic in nature  Seizure  I suspect patient had focal onset seizure with secondary

## 2020-03-23 NOTE — PROGRESS NOTES
[facility]    Progress Note    Anton Taylor Patient Status:  Inpatient    1955 MRN [id number]   Location [facility] 3W/SW Attending Shruthi Osborne MD   Hosp Day # 6 PCP No primary care provider on file.         Subjective hygiene  Complete smoking cessation  Outpatient yearly low-dose chest CT for screening for lung cancer    4- DVT prophylaxis   SCD         Results:     Lab Results   Component Value Date    WBC 10.8 03/22/2020    HGB 13.8 03/22/2020    HCT 42.3 03/22/2020

## 2020-03-23 NOTE — PROGRESS NOTES
[facility]    Progress Note    Tab Dominguez Patient Status:  Inpatient    1955 MRN [id number]   Location [facility] 3W/SW Attending Marta Beauchamp MD   Hosp Day # 6 PCP No primary care provider on file.        Subjective: ECA with little change since prior study, consistent with some degree of stenosis.     Dictated by (CST): Chichi Chang MD on 3/21/2020 at 8:29 PM     Finalized by (CST): Maria E Hurley MD on 3/21/2020 at 8:38 PM          Xr Chest Ap [facility] immunocompromised.   Follow up CT chest in 4-6 weeks then yearly checkups after that     Smoking cessation  -counseled patient to quit     Frontal cavernoma  - continue keppra for seizure proph  - no intervention per neurosurgery    Deconditioning   - PT/OT

## 2020-03-24 NOTE — PLAN OF CARE
Patient transferring with 1 assist and stand pivot into the rolling chair. Right foot weakness + pain with movement noted. Keegan Bryson is alert and oriented but forgetful and feels fatigued . Some orthostatic hypotension observed while patient up in the chair. bleeding  - Monitor lab trends  - Patient is to report abnormal signs of bleeding to staff  - Avoid use of toothpicks and dental floss  - Use electric shaver for shaving  - Use soft bristle tooth brush  - Limit straining and forceful nose blowing  Outcome: indicated  - Manage/alleviate anxiety  - Monitor for signs/symptoms of CO2 retention  Outcome: Progressing     Problem: GASTROINTESTINAL - ADULT  Goal: Maintains or returns to baseline bowel function  Description  INTERVENTIONS:  - Assess bowel function  - secretions as needed  - Reorient patient post seizure  - Seizure pads on all 4 side rails  - Instruct patient/family to notify RN of any seizure activity  - Instruct patient/family to call for assistance with activity based on assessment  Outcome: Progress

## 2020-03-24 NOTE — PLAN OF CARE
Patient is a/ox4, forgetful. RA. Up with standby assist to rolling chair. Pt is refusing most meals, only eating some of ensure pudding. Daily NIH and neuro assessments. Norco for pain PRN. IVF @ 50 ml/hr.  Droplet & contact isolation & seizure precautions Use electric shaver for shaving  - Use soft bristle tooth brush  - Limit straining and forceful nose blowing  Outcome: Progressing     Problem: CARDIOVASCULAR - ADULT  Goal: Maintains optimal cardiac output and hemodynamic stability  Description  INTERVENT ADULT  Goal: Maintains or returns to baseline bowel function  Description  INTERVENTIONS:  - Assess bowel function  - Maintain adequate hydration with IV or PO as ordered and tolerated  - Evaluate effectiveness of GI medications  - Encourage mobilization a seizure activity  - Instruct patient/family to call for assistance with activity based on assessment  Outcome: Progressing  Goal: Achieves maximal functionality and self care  Description  INTERVENTIONS  - Monitor swallowing and airway patency with patient

## 2020-03-24 NOTE — PROGRESS NOTES
[facility]    Progress Note    Sameul Severe Patient Status:  Inpatient    1955 MRN [id number]   Location [facility] 3W/SW Attending Real Townsend MD   Hosp Day # 7 PCP No primary care provider on file.        Subjective: little change since prior study, consistent with some degree of stenosis.     Dictated by (CST): Melinda Hurley MD on 3/21/2020 at 8:29 PM     Finalized by (CST): Melinda Hurley MD on 3/21/2020 at 8:38 PM          Xr Chest Ap Portable  (cp Frontal cavernoma  - continue keppra for seizure proph  - no intervention per neurosurgery    Deconditioning   - PT/OT eval- reccomending rehab      Quality:  · DVT Prophylaxis: SCD  · CODE status: Full     >35 min spent with patient.  > 50% time was sp

## 2020-03-24 NOTE — PROGRESS NOTES
[facility]     Progress Note        Flash Dickson Patient Status:  Inpatient    1955 MRN [id number]   Location [facility] 2W/SW Attending Jay Dobbs,    Hosp Day # 7 PCP No primary care provider on file.        Subj Scattered rales  GI: abdomen soft, non tender  Extremities: no clubbing, cyanosis, edema  Neurologic: Following commands  Skin: warm, dry      Results:     Lab Results   Component Value Date    WBC 13.9 03/24/2020    HGB 12.5 03/24/2020    HCT 37.2 03/24/2

## 2020-03-24 NOTE — PLAN OF CARE
Patient alert and oriented x 4. VSS. RA. IV fluids. PO levaquin. Plan to discharge to SNF. Will continue to monitor.     Problem: Patient Centered Care  Goal: Patient preferences are identified and integrated in the patient's plan of care  Description  Inte stability  Description  INTERVENTIONS:  - Monitor vital signs, rhythm, and trends  - Monitor for bleeding, hypotension and signs of decreased cardiac output  - Evaluate effectiveness of vasoactive medications to optimize hemodynamic stability  - Monitor ar medications  - Encourage mobilization and activity  - Obtain nutritional consult as needed  - Establish a toileting routine/schedule  - Consider collaborating with pharmacy to review patient's medication profile  Outcome: Progressing     Problem: SKIN/TISS swallowing and airway patency with patient fatigue and changes in neurological status  - Encourage and assist patient to increase activity and self care with guidance from PT/OT  - Encourage visually impaired, hearing impaired and aphasic patients to use a

## 2020-03-25 NOTE — SLP NOTE
ADULT VIDEOFLUOROSCOPIC SWALLOWING STUDY    Admission Date: 3/17/2020  Evaluation Date: 03/25/20  Radiologist:  Dr. Robles Leonard: Mechanical soft ground(Extra Sauces/Gravies )  Diet Recommendations - Liquid: ASSESSMENT   DYSPHAGIA ASSESSMENT  Test completed in conjunction with Radiologist.  Patient Positioned: Upright;Midline. Patient Viewed: Lateral.  Patient Alertness: Fully alert. Consistencies Presented: Thin liquids; Nectar thick liquids;Puree; Soft s Puree): Impaired  Triggered at: Valleculae  Premature Spillage to: Valleculae  Laryngeal Penetration: None  Tracheal Aspiration: None  Strategy(ies) Implemented (Puree):  Slow rate;Small bites and sips  Effectiveness: Yes  SOFT SOLID  Oral Phase of Swallow consistency and thin liquids via TSP  without overt signs or symptoms of aspiration with 100 % accuracy over 1-2 session(s).   In Progress   Goal #2 The patient/family/caregiver will demonstrate understanding and implementation of aspiration precautions and

## 2020-03-25 NOTE — PHYSICAL THERAPY NOTE
PHYSICAL THERAPY TREATMENT NOTE - INPATIENT     Room Number: 269/981-Z       Presenting Problem: Seizure, unresponsive    Problem List  Principal Problem:    Seizure [facility])  Active Problems:    Intracranial hemorrhage ([facility]      PHYSICAL THERAPY ASSESSMENT +           Static Standing: Fair  Dynamic Standing: Fair -    PT worn mask, goggles for treatment isolation precautions lifted after session and confirmed by Brooke Mendoza RN, negative Covid 19 results confirmed. Lanre Stoll AM-PAC '6-Clicks' INPATIENT SHORT FORM - #2  Current Status  CGA with RW   Goal #3 Patient is able to ambulate 50 feet with assist device: walker - rolling at assistance level: supervision   Goal #3   Current Status  CGA amb 20' with RW decreased step length   Goal #4     Goal #4   Current Status

## 2020-03-25 NOTE — PROGRESS NOTES
[facility]    Progress Note    Elgin Pedersen Patient Status:  Inpatient    1955 MRN [id number]   Location [facility] 3W/SW Attending Laney Trevino MD   Hosp Day # 8 PCP No primary care provider on file.        Subjective: following    Acute lacunar infarct  Continue asa, plavix   Statin     Acute respiratory failure  Secondary to COPD  Secondary to psuedomonas pna  Resolved  Monitor O2 sats  Continue antibiotics  Pulmonary following    Acute pna  Positive for pseudomonas  C

## 2020-03-25 NOTE — CM/SW NOTE
Case Management/ Progression of Care    Message left for daughter Lyly Cartwright [phone]  To follow up on Sub Acute Rehabilitation choices for Dad. SW/CM to remain available for support and/or discharge planning.          Benjamin Floyd RN Case Manager   Ex

## 2020-03-25 NOTE — PROGRESS NOTES
[facility]     Progress Note        Christina Nir Patient Status:  Inpatient    1955 MRN [id number]   Location [facility] 2W/SW Attending Beba Sanchez,    Hosp Day # 8 PCP No primary care provider on file.        Subj Following commands  Skin: warm, dry      Results:     Lab Results   Component Value Date    K 4.2 03/24/2020                   Assessment   1. Acute encephalopathy, improved  2. Left frontal lobe intracranial hemorrhage  3. Pseudomonas pneumonia  4.   Hy

## 2020-03-25 NOTE — PROGRESS NOTES
1st attempt [facility] apt request      [address]  [address]  [address] [facility] [phone]    Apt made Tues.  May 12th @11:45am

## 2020-03-25 NOTE — PLAN OF CARE
Problem: Patient Centered Care  Goal: Patient preferences are identified and integrated in the patient's plan of care  Description  Interventions:  - What would you like us to know as we care for you? I live at home with my daughter.  Please keep her upda decreased cardiac output  - Evaluate effectiveness of vasoactive medications to optimize hemodynamic stability  - Monitor arterial and/or venous puncture sites for bleeding and/or hematoma  - Assess quality of pulses, skin color and temperature  - Assess f as needed  - Establish a toileting routine/schedule  - Consider collaborating with pharmacy to review patient's medication profile  Outcome: Progressing     Problem: SKIN/TISSUE INTEGRITY - ADULT  Goal: Skin integrity remains intact  Description  INTERVENT swallowing and airway patency with patient fatigue and changes in neurological status  - Encourage and assist patient to increase activity and self care with guidance from PT/OT  - Encourage visually impaired, hearing impaired and aphasic patients to use a

## 2020-03-25 NOTE — PLAN OF CARE
Patient a/ox4, can be forgetful. VSS. RA. COVID-19 results negative, isolation continued per protocol. Patient is a stand/pivot to the rolling chair. Norco given for pain. Pureed/nectar thick diet. Iv fluids. Po antibiotics. Awaiting SNF placement.  Will co and forceful nose blowing  Outcome: Progressing     Problem: CARDIOVASCULAR - ADULT  Goal: Maintains optimal cardiac output and hemodynamic stability  Description  INTERVENTIONS:  - Monitor vital signs, rhythm, and trends  - Monitor for bleeding, hypotensi function  Description  INTERVENTIONS:  - Assess bowel function  - Maintain adequate hydration with IV or PO as ordered and tolerated  - Evaluate effectiveness of GI medications  - Encourage mobilization and activity  - Obtain nutritional consult as needed assistance with activity based on assessment  Outcome: Progressing  Goal: Achieves maximal functionality and self care  Description  INTERVENTIONS  - Monitor swallowing and airway patency with patient fatigue and changes in neurological status  - Encourage

## 2020-03-25 NOTE — SLP NOTE
SPEECH DAILY NOTE - INPATIENT    ASSESSMENT & PLAN   ASSESSMENT  SWALLOW THERAPY  RN reports pt tolerates diet well with no overt CSA. Pt declined any pureed trials in today's session.  Pt tolerates nectar thickened liquids and pills whole with nectar thick direct model. Exercises left at bedside. Pt able to complete mental manipulation tasks with reinforcement of repetition strategy with moderate support.  Problem solving and additional memory tasks not targeted as RN entered the room for vitals and medicati accuracy over 1-2 session(s).     Pt with LIMITED trials of solids and thin liquids via open cup with no overt clinical signs of aspiration (e.g., immediate/delayed throat clear, immediate/delayed cough, wet vocal quality, increased O2 effort) for 100% tria support pt able to overexaggerate targeted words in >6 word sentences. Handouts left at bedside for generalization. Pt continues to report R-sided tongue biting.      In Progress   Goal #4 The patient will use trained speech compensatory strategies of YAMINI

## 2020-03-25 NOTE — OCCUPATIONAL THERAPY NOTE
OCCUPATIONAL THERAPY TREATMENT NOTE - INPATIENT        Room Number: 186/491-C      Presenting Problem: (CVA)    Problem List  Principal Problem:    Seizure ([address]  Active Problems:    Intracranial hemorrhage ([address]      OCCUPATIONAL THERAPY ASSESSMENT     RN Bearing Restriction: None    PAIN ASSESSMENT  Ratin  Location: L side of neck and L heel  Management Techniques: Relaxation;Repositioning     ACTIVITY TOLERANCE    ACTIVITIES OF DAILY LIVING ASSESSMENT  AM-PAC ‘6-Clicks’ Inpatient Daily Activity Short

## 2020-03-25 NOTE — PLAN OF CARE
Infection Control conferred with Dr. Jamie Patel, and per Dr. Jamie alfaro to discontinue contact and droplet isolation precautions. No isolation required at this time. Will monitor closely.

## 2020-03-26 NOTE — PROGRESS NOTES
[facility]    Progress Note    Mandeep Vyas Patient Status:  Inpatient    1955 MRN [id number]   Location [facility] 3W/SW Attending Chica Flanagan MD   Hosp Day # 9 PCP No primary care provider on file.        Subjective: PRN Inpatient Meds:      HYDROcodone-acetaminophen, Albuterol Sulfate HFA, acetaminophen, hydrALAzine HCl    Results:     Recent Labs   Lab 03/22/20  0422 03/24/20  0452 03/26/20  0428   RBC 4.53 4.04 3.97   HGB 13.8 12.5* 12.4*   HCT 42.3 37.2* 37.1*   MC K on protocol     Smoking cessation  -counseled patient to quit      Frontal cavernoma  - continue keppra for seizure proph  - no intervention per neurosurgery     Deconditioning   - PT/OT eval- reccomending rehab  -discharge to rehab when insurance auth r

## 2020-03-26 NOTE — SLP NOTE
SLP attempt for ongoing dysphagia therapy/speech therapy. Pt sleeping and easy to wake. Pt declines SLP services at this time with encouragement. SLP to f/u as pt willing to participate and as schedule permits.  RN, Scout Cancino, reports pt with limited/poor appe

## 2020-03-26 NOTE — CM/SW NOTE
Received call from Karon figueroa/ [facility] - pt is clinically accepted. Karon Aguayo informed SW that he will submit for insurance authorization. Need insurance authorization prior to d/c.     PLAN: [facility], pend ins auth    CHATO/JULIANNA to remain available for sup

## 2020-03-26 NOTE — PLAN OF CARE
VSS. Pt complaining of R leg, L foot, and back pain, pt received norco tonight. IVF NS @ 50ml/hr. Neuro checks qshift.      Problem: Patient Centered Care  Goal: Patient preferences are identified and integrated in the patient's plan of care  Description  I stability  Description  INTERVENTIONS:  - Monitor vital signs, rhythm, and trends  - Monitor for bleeding, hypotension and signs of decreased cardiac output  - Evaluate effectiveness of vasoactive medications to optimize hemodynamic stability  - Monitor ar medications  - Encourage mobilization and activity  - Obtain nutritional consult as needed  - Establish a toileting routine/schedule  - Consider collaborating with pharmacy to review patient's medication profile  Outcome: Progressing     Problem: SKIN/TISS swallowing and airway patency with patient fatigue and changes in neurological status  - Encourage and assist patient to increase activity and self care with guidance from PT/OT  - Encourage visually impaired, hearing impaired and aphasic patients to use a

## 2020-03-26 NOTE — CM/SW NOTE
Case Management/ Progression of Care    Patient insurance denied initial request for Sub Acute Rehabilitation. A peer to peer will need to be completed by Physician. Physician is to call [facility] at [phone] option 5.    The Peer to Peer needs to

## 2020-03-26 NOTE — OCCUPATIONAL THERAPY NOTE
OCCUPATIONAL THERAPY TREATMENT NOTE - INPATIENT        Room Number: 423/853-J           Presenting Problem: (CVA)    Problem List  Principal Problem:    Seizure ([address]  Active Problems:    Intracranial hemorrhage ([address]      OCCUPATIONAL THERAPY ASSESSMENT ‘6-Clicks’ Inpatient Daily Activity Short Form  How much help from another person does the patient currently need…  -   Putting on and taking off regular lower body clothing?: A Little  -   Bathing (including washing, rinsing, drying)?: A Little  -   Toile

## 2020-03-26 NOTE — PHYSICAL THERAPY NOTE
PHYSICAL THERAPY HIP TREATMENT NOTE - INPATIENT    Room Number: 073/610-Q            Presenting Problem: Seizure, unresponsive    Problem List  Principal Problem:    Seizure ([facility]  Active Problems:    Intracranial hemorrhage ([facility])      PHYSICAL THERAPY ASS 3  Location: low back and leg soreness  Management Techniques: Activity promotion; Body mechanics;Breathing techniques;Relaxation;Repositioning    BALANCE  Static Sitting: Good  Dynamic Sitting: Fair +  Static Standing: Fair  Dynamic [facility] assistance level: supervision with walker - rolling      Goal #2  Current Status  SBA with RW   Goal #3 Patient is able to ambulate 50 feet with assist device: walker - rolling at assistance level: supervision   Goal #3   Current Status  SBA amb 300' with

## 2020-03-26 NOTE — CM/SW NOTE
Case Management/ Progression of Care    Cm spoke with daughter Olena Wilkins, who would like, referrals sent to the following Sub Acute Rehabilitation facilities:    1. [facility] 2. [facility], 3. [facility].      Patient

## 2020-03-26 NOTE — PROGRESS NOTES
[place] [facility] - [facility]     Progress Note        Cleven Dies Patient Status:  Inpatient    1955 MRN [id number]   Location [facility] 2W/SW Attending Mary Rojas DO   Hosp Day # 9 PCP No primary care provider on file.        Subj soft, non tender  Extremities: no clubbing, cyanosis, edema  Neurologic: Following commands  Skin: warm, dry      Results:     Lab Results   Component Value Date    WBC 8.7 03/26/2020    HGB 12.4 03/26/2020    HCT 37.1 03/26/2020    .0 03/26/2020

## 2020-03-27 NOTE — OCCUPATIONAL THERAPY NOTE
OCCUPATIONAL THERAPY TREATMENT NOTE - INPATIENT        Room Number: 802/842-D           Presenting Problem: (CVA)    Problem List  Principal Problem:    Seizure ([address]  Active Problems:    Intracranial hemorrhage ([address]      OCCUPATIONAL THERAPY ASSESSMENT Bathing (including washing, rinsing, drying)?: A Little  -   Toileting, which includes using toilet, bedpan or urinal? : A Little  -   Putting on and taking off regular upper body clothing?: None  -   Taking care of personal grooming such as brushing teeth

## 2020-03-27 NOTE — PLAN OF CARE
Problem: Patient Centered Care  Goal: Patient preferences are identified and integrated in the patient's plan of care  Description  Interventions:  - What would you like us to know as we care for you? I live at home with my daughter.  Please keep her upda decreased cardiac output  - Evaluate effectiveness of vasoactive medications to optimize hemodynamic stability  - Monitor arterial and/or venous puncture sites for bleeding and/or hematoma  - Assess quality of pulses, skin color and temperature  - Assess f Consider collaborating with pharmacy to review patient's medication profile  Outcome: Progressing     Problem: SKIN/TISSUE INTEGRITY - ADULT  Goal: Skin integrity remains intact  Description  INTERVENTIONS  - Assess and document risk factors for pressure u self care with guidance from PT/OT  - Encourage visually impaired, hearing impaired and aphasic patients to use assistive/communication devices  Outcome: Progressing     Problem: Impaired Functional Mobility  Goal: Achieve highest/safest level of mobility/

## 2020-03-27 NOTE — PHYSICAL THERAPY NOTE
PHYSICAL THERAPY TREATMENT NOTE - INPATIENT     Room Number: 154/091-W       Presenting Problem: Seizure, unresponsive    Problem List  Principal Problem:    Seizure [facility])  Active Problems:    [facility]      PHYSICAL THERAPY ASSESSMENT medicate)    BALANCE                                                                                                                     Static Sitting: Good  Dynamic Sitting: Fair +           Static Standing: Fair  Dynamic Standing: Fair -    ACTIVITY LEVY return to independent level    THERAPEUTIC EXERCISES  Lower Extremity Alternating marching  Hip AB/AD  Heel raises  Toe raises  standing balance activities without UE support-min A     Position Standing       Patient End of Session: Up in chair; With [facility] sta

## 2020-03-27 NOTE — PROGRESS NOTES
[facility]     Progress Note        Flash Dickson Patient Status:  Inpatient    1955 MRN [id number]   Location [facility] 2W/SW Attending Jay Dobbs,    Hosp Day # 10 PCP No primary care provider on file.        Sub edema  Neurologic: Following commands  Skin: warm, dry      Results:     Lab Results   Component Value Date    K 4.4 03/26/2020       Xr Video Swallow (cpt=74230)    Result Date: 3/25/2020  CONCLUSION:   Deep penetration without aspiration  Please see dedi

## 2020-03-27 NOTE — CM/SW NOTE
Case Management/ Progression of Care    Received notification from [facility] patient insurance [facility] has denied admission to  [place] [phone].   Patient has an option to appeal denial.   Appeal denial by a family member or

## 2020-03-27 NOTE — CM/SW NOTE
Case Management/ Progression of Care    Family requesting a rollator walker with a seat for home use, when patient is discharge,   [facility] Face to Face Order placed in chart for MD signatures.    Orders received for [address] and Face t

## 2020-03-27 NOTE — PLAN OF CARE
Problem: Patient Centered Care  Goal: Patient preferences are identified and integrated in the patient's plan of care  Description  Interventions:  - What would you like us to know as we care for you? I live at home with my daughter.  Please keep her upda baseline  Description  INTERVENTIONS:  - Continuous cardiac monitoring, monitor vital signs, obtain 12 lead EKG if indicated  - Evaluate effectiveness of antiarrhythmic and heart rate control medications as ordered  - Initiate emergency measures for life t aphasic patients to use assistive/communication devices  Outcome: Progressing     Problem: GASTROINTESTINAL - ADULT  Goal: Maintains or returns to baseline bowel function  Description  INTERVENTIONS:  - Assess bowel function  - Maintain adequate hydration within patient's reach. Plan is discharge to [facility] pending insurance. Will continue to monitor pt.

## 2020-03-27 NOTE — PROGRESS NOTES
[facility] - [facility]    Progress Note    Stella Chain Patient Status:  Inpatient    1955 MRN [id number]   Location [facility] 3W/SW Attending Ny Salcido MD   Hosp Day # 10 PCP No primary care provider on file.        Subjective: Current PRN Inpatient Meds:      HYDROcodone-acetaminophen, Albuterol Sulfate HFA, acetaminophen, hydrALAzine HCl    Results:     Recent Labs   Lab 03/22/20  0422 03/24/20  0452 03/26/20  0428   RBC 4.53 4.04 3.97   HGB 13.8 12.5* 12.4*   HCT 42.3 37 yearly checkups after that      Leukocytosis  - Rsolved.      Hypokalemia  Replete K on protocol     Smoking cessation  -counseled patient to quit      Frontal cavernoma  - continue keppra for seizure proph  - no intervention per neurosurgery     Nathan

## 2020-03-27 NOTE — SLP NOTE
SPEECH DAILY NOTE - INPATIENT    ASSESSMENT & PLAN   ASSESSMENT  SLP f/u for ongoing dysphagia therapy and meal assessment per recommendations of ground and thin liquids via tsp per VFSS.  SLP reviewed aspiration precautions and safe swallowing compensatory perihilar nodular opacity, unchanged since previous day, but new since 3/18/2020. This may relate to a small focus of pneumonia/infection. 2. Interval extubation. 3. Emphysema with right apical scarring. 4. Lesser incidental findings as above.       Die Slow rate, Small bites, Alternate liquids/solids, No straws, Upright 90 degrees, Liquids via tsp amount only, Eliminate distractions, Supervision with meals with min assistance 100 % of the time across 2 sessions.   Pt observed taking slow/small bites with Further cognitive-communicative assessment as appropriate.      GOAL MET on 3/25 GOAL MET   Goal # 6 Pt will complete problem solving tasks for daily activities with minimal cues with 90% accuracy within 3 sessions.   Pt completing daily problem solving ta

## 2020-03-27 NOTE — PAYOR COMM NOTE
--------------  DISCHARGE REVIEW    Temo Bull [place] [facility] [place]  Subscriber #:  [id number]  Authorization Number: [id number]    Admit date: 3/9/20  Admit time:  36  Discharge Date: 3/10/2020  1:06 PM     Admitting Physician: Nerissa Skiff, MD  Attending Phys Location: Right arm)   Pulse 82   Temp 97.6 °F (36.4 °C) (Temporal)   Resp (!) 32   Ht 5' 11\" (1.803 m)   Wt 208 lb (94.3 kg)   SpO2 97%   BMI 29.01 kg/m²      Gen:  NAD. A and O x  3  Left neck wound c/d/i  CV:   RRR.   No m/g/r  Pulm:   CTA bilat  Abd: Instructions Prescription details   HYDROcodone-acetaminophen 5-325 MG Tabs  Commonly known as:  Janina Solitario  What changed:  when to take this      Take 1 tablet by mouth every 8 (eight) hours as needed for Pain.    Quantity:  60 tablet  Refills:  0     lisinopri S/p left carotid endarterectomy    Lace+ Score: 50  59-90 High Risk  29-58 Medium Risk  0-28   Low Risk. TCM Follow-Up Recommendation:  LACE 29-58:  Moderate Risk of readmission after discharge from the hospital.      >35 minutes spent preparing this dis

## 2020-03-27 NOTE — DIETARY NOTE
ADULT NUTRITION REASSESSMENT     Pt is at moderate nutrition risk. Pt does not meet malnutrition criteria.       RECOMMENDATIONS TO MD:  CPM   RD added Oral Nutritional Supplements TID to maximize nutrition     NUTRITION DIAGNOSIS/PROBLEM #1:  Emy Tompkins gravy with Thin  liquid   - Medical Food Supplements-  - Vitamin and mineral supplements: none  - Feeding assistance: meal set up and assist pt as needed  - Nutrition education: not appropriate  - Coordination of nutrition care: collaboration with other pr 03/26/20  0429 03/26/20  1505   GLU 81  --  92  --    BUN 14  --  14  --    CREATSERUM 0.60*  --  0.63*  --    CA 8.6  --  8.6  --      --  140  --    K 3.4* 4.2 3.6 4.4     --  106  --    CO2 25.0  --  25.0  --    OSMOCALC 288  --  290  --

## 2020-03-28 NOTE — PLAN OF CARE
Problem: Patient Centered Care  Goal: Patient preferences are identified and integrated in the patient's plan of care  Description  Interventions:  - What would you like us to know as we care for you? I live at home with my daughter.  Please keep her upda cardiac output  - Evaluate effectiveness of vasoactive medications to optimize hemodynamic stability  - Monitor arterial and/or venous puncture sites for bleeding and/or hematoma  - Assess quality of pulses, skin color and temperature  - Assess for signs o with pharmacy to review patient's medication profile  Outcome: Completed     Problem: SKIN/TISSUE INTEGRITY - ADULT  Goal: Skin integrity remains intact  Description  INTERVENTIONS  - Assess and document risk factors for pressure ulcer development  - Asses PT/OT  - Encourage visually impaired, hearing impaired and aphasic patients to use assistive/communication devices  Outcome: Completed     Problem: Impaired Functional Mobility  Goal: Achieve highest/safest level of mobility/gait  Description  Intervention

## 2020-03-28 NOTE — DISCHARGE SUMMARY
[facility]  Discharge Summary     Kourtney Means  : 1955    Status: Inpatient  Day #: 11    Attending: Angel Best MD  PCP: No primary care provider on file.      Date of Admission: 3/17/2020  Date of Discharge: 3/28/2020     JANA LUGO    Frontal cavernoma  - continue keppra for seizure proph  - no intervention per neurosurgery     Deconditioning   - PT/OT eval- reccomending rehab  -insurance denied rehab, peer to peer done.  Appeal in progress, but patient now wants to go home with [facility] days.   Stop taking on:  March 30, 2020  Quantity:  3 tablet  Refills:  0        CONTINUE taking these medications      Instructions Prescription details   carvedilol 25 MG Tabs  Commonly known as:  COREG      Take 25 mg by mouth 2 (two) times daily with m

## 2020-03-28 NOTE — PLAN OF CARE
Problem: Patient Centered Care  Goal: Patient preferences are identified and integrated in the patient's plan of care  Description  Interventions:  - What would you like us to know as we care for you? I live at home with my daughter.  Please keep her upda as ordered  - Initiate emergency measures for life threatening arrhythmias  - Monitor electrolytes and administer replacement therapy as ordered  Outcome: Progressing     Problem: NEUROLOGICAL - ADULT  Goal: Achieves stable or improved neurological status Assess patient's functional ability and stability  - Promote increasing activity/tolerance for mobility and gait  - Educate and engage patient/family in tolerated activity level and precautions  - Recommend use of chair position in bed 3 times per day  Out If not, then patient may go home with home health care. PRN pain pills for chronic right leg pain. Call light and urinal within patient's reach. IV fluids infusing. Will continue to monitor pt.

## 2020-03-28 NOTE — CM/SW NOTE
Case Management/ Progression of Care     7680:  Upon discharge, this CM received notice from [employer]  Expedited appeal approved  for Sub Acute Rehabilitation approved, family and patient notified and are considering home or [facility].  They will

## 2020-03-28 NOTE — PHYSICAL THERAPY NOTE
PHYSICAL THERAPY TREATMENT NOTE - INPATIENT     Room Number: 259/995-S       Presenting Problem: Seizure, unresponsive    Problem List  Principal Problem:    Seizure [facility])  Active Problems:    [facility]      PHYSICAL THERAPY ASSESSMENT have...  -   Turning over in bed (including adjusting bedclothes, sheets and blankets)?: A Little   -   Sitting down on and standing up from a chair with arms (e.g., wheelchair, bedside commode, etc.): A Little   -   Moving from lying on back to sitting on Patient to demonstrate independence with home activity/exercise instructions provided to patient in preparation for discharge.

## 2020-03-28 NOTE — CM/SW NOTE
03/28/20 1100   Discharge disposition   Expected discharge disposition Home-Health   Name of Facillity/Home Care/Hospice   ([address])   Discharge transportation [address]   Patient discharging today with [facility].  Patients peer

## 2020-03-30 NOTE — PAYOR COMM NOTE
--------------  DISCHARGE REVIEW    PayorDel Riorosy Gan MA [facility] [place]  Subscriber #:  [id number]  Authorization Number: [id number]    Admit date: 3/17/20  Admit time:  8280  Discharge Date: 3/28/2020  2:24 PM     Admitting Physician: Andrea Mc MD  Attending Physic    - Continue keppra 500 BID   - Neurology following  - mental status stable now     Acute lacunar infarct  - Continue asa, plavix   - Statin      Acute respiratory failure  Secondary to COPD  Secondary to psuedomonas pna  Resolved  - Monitor O2 sats  - Co 1 tablet (81 mg total) by Per NG Tube route daily. Quantity:  30 tablet  Refills:  0     atorvastatin 10 MG Tabs  Commonly known as:  LIPITOR      Take 1 tablet (10 mg total) by mouth nightly.    Quantity:  30 tablet  Refills:  0     Clopidogrel Bisulfate 12828  [phone]             Eliana Vaughn MD.    Specialty:  Family Medicine  Why:  As needed  Contact information:  [address]  [address] Discharge Diagnoses: seizure    Lace+ Score: 64  59-90 Hig

## 2020-04-01 NOTE — TELEPHONE ENCOUNTER
Attempted to call patient, LMTCB. Informed daughter Ginger Ambrocio, unable to release MARCUS without patient's consent. Daughter verbalized understanding, states she will have patient call our office.

## 2020-04-01 NOTE — TELEPHONE ENCOUNTER
Spoke with pt daughter/Edna,Emergency contact who states per pt discharge note pt should follow-up with  within 2 week.  please advise when you would like for pt to follow-up or to do a phone visit.

## 2020-04-01 NOTE — TELEPHONE ENCOUNTER
Daughter requesting two week hospital follow up appt. Pt was seen by Dr. Barrie Harp while hospitalized at [address] [place] [address].  Please call [phone]

## 2020-04-01 NOTE — TELEPHONE ENCOUNTER
Spoke with patient, verbal consent obtained to speak to daughter Nan to release sensitive health information. Pt denies cough, fever, shortness of breath. Informed daughter of Dr. Kaley Celestin message. Daughter states patient has chronic cough but nothing more that usual, not coughing up any sputum. No appointment scheduled at this time.

## 2020-04-01 NOTE — TELEPHONE ENCOUNTER
Patient was discharged March 27, 2020. From my perspective he received course of antibiotics for pneumonia. If he is having any respiratory symptoms I would advise him this set up phone visit for mid April. If he is not having any signs of any respiratory symptoms such as dyspnea, ongoing cough or any fevers given the current healthcare situation with COVID-19 I do not believe it is necessary to set up follow-up phone visit appointment.

## 2020-04-20 NOTE — TELEPHONE ENCOUNTER
Spoke to daughter Michaela perdue. Offered and took earlier video visit 4/23/20 with Dr Marybel Ruiz. Pre registration for video visit explained. Patient was originally scheduled for office visit 5/12/20 with Dr Vinicius Barraza.

## 2020-04-23 NOTE — PROGRESS NOTES
Virtual/Telephone Check-In    Simon Duran verbally consents to a Virtual/Telephone Check-In service on 04/23/20. Patient understands and accepts financial responsibility for any deductible, co-insurance and/or co-pays associated with this service.

## 2020-11-25 ENCOUNTER — TELEPHONE (OUTPATIENT)
Dept: INTERNAL MEDICINE CLINIC | Facility: CLINIC | Age: 65
End: 2020-11-25

## 2020-12-18 ENCOUNTER — TELEPHONE (OUTPATIENT)
Dept: FAMILY MEDICINE CLINIC | Facility: CLINIC | Age: 65
End: 2020-12-18

## 2020-12-18 NOTE — TELEPHONE ENCOUNTER
Calista Benoit from [facility] office wanted to know if  will be signing death certificate?  Please advise

## 2020-12-19 NOTE — TELEPHONE ENCOUNTER
I have not seen it either I did called [address] and spoke with Sussy Wolf she said she was going to check with another coworker

## 2020-12-22 NOTE — TELEPHONE ENCOUNTER
Mary Fishman calling from [facility] to state form was filled out incorrectly    Per Mary Fishman immediate cause of death can't be listed as unknown. Per Mary Fishman where Dr Jana Womack entered pt has seizure disorder, and other diagnosis should be listed on the immediate cause of death line. Per Mary Fishman the  will not accept immediate cause of death unknown. Mary Fishman is asking for form to be refaxed with corrections to  [phone]    On site staff Mary Fishman requests to be contacted once form is faxed. Thank you.

## 2020-12-22 NOTE — TELEPHONE ENCOUNTER
Spoke to [place] Dahiana Leong, Elisabeth Quarles, and he reviewed chart and patient was found unresponsive in bed by his daughter. No foul play was noted. No autopsy was done.     Await death certification form to be completed again

## 2020-12-23 NOTE — TELEPHONE ENCOUNTER
Kayleen verduzco [employer] [place] states the death certificate is filled out incorrectly for the cause can't be unknown       Please advise she will resend      Please advise

## 2021-02-04 ENCOUNTER — TELEPHONE (OUTPATIENT)
Dept: FAMILY MEDICINE CLINIC | Facility: CLINIC | Age: 66
End: 2021-02-04

## 2022-08-08 NOTE — PHYSICAL THERAPY NOTE
PHYSICAL THERAPY TREATMENT NOTE - INPATIENT     Room Number: 127/996-L       Presenting Problem: Seizure, unresponsive    Problem List  Principal Problem:    Seizure [facility])  Active Problems:    Intracranial hemorrhage ([facility] [address] [facility]      PHYSICAL THERAPY ASSESSMENT Patient returned call and have some questions about returning to work .   Static Sitting: Fair +  Dynamic Sitting: Fair           Static Standing: Fair -  Dynamic Standing: Poor +    AM-PAC '6-Clicks' INPATIENT SHORT FORM - BASIC MOBILITY  How much di 8ft with rolling walker min A x 1 chair follow   Goal #4     Goal #4   Current Status     Goal #5 Patient to demonstrate independence with home activity/exercise instructions provided to patient in preparation for discharge.    Goal #5   Current Status In p

## (undated) DEVICE — CV: Brand: MEDLINE INDUSTRIES, INC.

## (undated) DEVICE — DRAIN INCS 7MM 20CMX7MM SIL

## (undated) DEVICE — 6 ML SYRINGE LUER-LOCK TIP: Brand: MONOJECT

## (undated) DEVICE — SUTURE SILK 2-0 SA65H

## (undated) DEVICE — BARD® JAVID™ CAROTID BYPASS SHUNT, 17F - 10F X 27.5CM: Brand: BARD® JAVID

## (undated) DEVICE — 3M™ TEGADERM™ TRANSPARENT FILM DRESSING, 1626W, 4 IN X 4-3/4 IN (10 CM X 12 CM), 50 EACH/CARTON, 4 CARTON/CASE: Brand: 3M™ TEGADERM™

## (undated) DEVICE — SUTURE SILK 2-0 FS

## (undated) DEVICE — NEEDLE HPO 18GA 1.5IN ECLPS

## (undated) DEVICE — SUTURE MONOCRYL 4-0 Y845G

## (undated) DEVICE — 60 ML SYRINGE LUER-LOCK TIP: Brand: MONOJECT

## (undated) DEVICE — ABSORBABLE HEMOSTAT (OXIDIZED REGENERATED CELLULOSE, U.S.P.): Brand: SURGICEL

## (undated) DEVICE — SOL  .9 1000ML BAG

## (undated) DEVICE — DRESSING FM 4.25X4.25IN PLMM

## (undated) DEVICE — COVER SGL STRL LGHT HNDL BLU

## (undated) DEVICE — 3M™ IOBAN™ 2 ANTIMICROBIAL INCISE DRAPE 6650EZ: Brand: IOBAN™ 2

## (undated) DEVICE — ENCORE® LATEX MICRO SIZE 8.5, STERILE LATEX POWDER-FREE SURGICAL GLOVE: Brand: ENCORE

## (undated) DEVICE — CHLORAPREP 26ML APPLICATOR

## (undated) DEVICE — Device: Brand: JELCO

## (undated) DEVICE — ENCORE® LATEX ACCLAIM SIZE 8, STERILE LATEX POWDER-FREE SURGICAL GLOVE: Brand: ENCORE

## (undated) DEVICE — DRAIN RELIAVAC 100CC

## (undated) DEVICE — SOL  .9 1000ML BTL

## (undated) DEVICE — CLIP SM W INTNL HRZN TI TPE LF

## (undated) DEVICE — SUTURE SILK 4-0 SA63H

## (undated) DEVICE — 12 ML SYRINGE LUER-LOCK TIP: Brand: MONOJECT

## (undated) DEVICE — GAMMEX® PI HYBRID SIZE 7.5, STERILE POWDER-FREE SURGICAL GLOVE, POLYISOPRENE AND NEOPRENE BLEND: Brand: GAMMEX

## (undated) DEVICE — SUTURE PROLENE 6-0 BV-1

## (undated) DEVICE — SUCTION CANISTER, 3000CC,SAFELINER: Brand: DEROYAL

## (undated) DEVICE — SUTURE CHROMIC 2-0 801H

## (undated) DEVICE — FORESIGHT LARGE SENSOR: Brand: FORESIGHT

## (undated) NOTE — MR AVS SNAPSHOT
[address]  [address]  [phone]               Thank you for choosing us for your health care visit with Satcy Caicedo DPM.  We are glad to serve you and happy to provide yo These medications were sent to [facility], [address] AT [facility] [address], [phone], [phone] [address] [address]     Phone:  [phone]    - Kwasi Cast

## (undated) NOTE — MR AVS SNAPSHOT
[address]                Thank you for choosing us for your health care visit with [facility] Wound Nurse.   We are glad to serve you and happy to provide you with Take 325 mg by mouth daily. atorvastatin 20 MG Tabs   Take 1 tablet (20 mg total) by mouth nightly. Commonly known as:  LIPITOR           carvedilol 25 MG Tabs   Take 1 tablet (25 mg total) by mouth 2 (two) times daily with meals.    Commonly kn

## (undated) NOTE — LETTER
3/27/2019              [facility]        [address] [address]         Dear Eric Palomino,    This letter is to inform you that our office has made several attempts to reach you by phone without success.   We were attempti

## (undated) NOTE — MR AVS SNAPSHOT
[facility] SPECIALTY [facility] [place] [address] [place]  [address] [phone]               Thank you for choosing us for your health care visit with Lea Estrada MD.  We are glad to serve you and happy to provide you with this summary of yo between 7:30am to 6pm and on Saturday between 8am and 1pm. Evening and weekend appointments for   your exam are available. [address] ([address] [place] [address]) [address] [facility] ([address])  [address]    1 you have any questions related to insurance coverage. Thank you. Wednesday March 08, 2017     Imaging:  US ART LE DOPPLERS (MEC=[id number])    Instructions:   To schedule a test at any [place] [address], call Central Scheduling at (199 If you are confident that your benefit plan will not require a referral or authorization, such as [place] Medicaid, please feel free to schedule your appointment immediately.  However, if you are unsure about the requirements   for authorization, please wa Take 1 tablet (5 mg total) by mouth daily. Commonly known as:  PRINIVIL,ZESTRIL           silver sulfADIAZINE 1 % Crea   Apply 1 Application topically 2 (two) times daily.    Commonly known as:  SILVADENE                Where to Get Your Medications

## (undated) NOTE — MR AVS SNAPSHOT
[facility] SPECIALTY [facility] - [place] [address] [address] [phone]               Thank you for choosing us for your health care visit with Kassie Gabriel MD.  We are glad to serve you and happy to provide you with this summary o Assoc Dx:  Ulcer of foot, unspecified laterality, with necrosis of muscle (HCC) [L97.503]           XR HEEL (CALCANEUS) (MIN 2 VIEWS), LEFT (CPT=73650)    Complete by:   Feb 06, 2017 (Approximate)    Assoc Dx:  Ulcer of foot, unspecified laterality, with n Leilani Izquierdo [facility] [[id number] CUSTOM]  Order #:  [id number]         **REFERRAL REQUEST**    Your physician has referred you to a specialist.  Your physician or the clinic staff will provide you with the phone number you should call to schedule your appoint

## (undated) NOTE — LETTER
12/06/17        Jolynn Stagers  [address]  [address] [facility] [address]      Dear Hailey Baker,    Our records indicate that you have outstanding lab work and or testing that was ordered for you and has not yet been completed:          Lipid Panel

## (undated) NOTE — MR AVS SNAPSHOT
[address]                Thank you for choosing us for your health care visit with [facility] Wound Nurse.   We are glad to serve you and happy to provide you with This list is accurate as of: 5/17/17  4:55 PM.  Always use your most recent med list.                ALPRAZolam 0.25 MG Tabs   Take 1 tablet (0.25 mg total) by mouth nightly as needed for Sleep.    Commonly known as:  XANAX           aspirin 325 MG Tabs   T Visit [facility] online at  [email or web address].tn

## (undated) NOTE — MR AVS SNAPSHOT
[address]                Thank you for choosing us for your health care visit with [facility] Wound Nurse.   We are glad to serve you and happy to provide you with This list is accurate as of: 5/10/17  5:20 PM.  Always use your most recent med list.                aspirin 325 MG Tabs   Take 325 mg by mouth daily. Atorvastatin Calcium 20 MG Tabs   Take 1 tablet (20 mg total) by mouth nightly.    Commonly know

## (undated) NOTE — MR AVS SNAPSHOT
[address]                Thank you for choosing us for your health care visit with [address] [facility] Wound Nurse.   We are glad to serve you and happy to provide you with This list is accurate as of: 5/31/17  4:54 PM.  Always use your most recent med list.                ALPRAZolam 0.5 MG Tabs   Take 1 tablet (0.5 mg total) by mouth nightly as needed for Sleep.    Commonly known as:  XANAX           aspirin [address]

## (undated) NOTE — IP AVS SNAPSHOT
[facility]            (For Outpatient Use Only) Initial Admit Date: 3/17/2020   Inpt/Obs Admit Date: Inpt: 3/17/20 / Obs: N/A   Discharge Date:    Delmis Dos Santos:  [de-identified]   MRN: [de-identified]   CSN: [id number]   CEID: [id number]        [facility] Subscriber ID:  Pt Rel to Subscriber:    Hospital Account Financial Class: Medicare Advantage    March 28, 2020

## (undated) NOTE — LETTER
Hospital Discharge Documentation  Please phone to schedule a hospital follow up appointment.     From: [address] Hospitalist's Office  Phone: [phone]    Patient discharged time/date: 3/10/2020  1:06 PM  Patient discharge disposition:  Home or Self /69 (BP Location: Right arm)   Pulse 82   Temp 97.6 °F (36.4 °C) (Temporal)   Resp (!) 32   Ht 5' 11\" (1.803 m)   Wt 208 lb (94.3 kg)   SpO2 97%   BMI 29.01 kg/m²      Gen:  NAD. A and O x  3  Left neck wound c/d/i  CV:   RRR.   No m/g/r  Pulm:   CT CHANGE how you take these medications      Instructions Prescription details   HYDROcodone-acetaminophen 5-325 MG Tabs  Commonly known as:  Sarah Rider  What changed:  when to take this      Take 1 tablet by mouth every 8 (eight) hours as needed for Pain.    [facility] Discharge Diagnoses: S/p left carotid endarterectomy    Lace+ Score: 50  59-90 High Risk  29-58 Medium Risk  0-28   Low Risk. TCM Follow-Up Recommendation:  LACE 29-58:  Moderate Risk of readmission after discharge from the hospital

## (undated) NOTE — MR AVS SNAPSHOT
[address]  [address]  [phone]               Thank you for choosing us for your health care visit with Eliana Owens DPM.  We are glad to serve you and happy to provide yo [employer] will allow you to access patient instructions from your recent visit,  view other health information, and more. To sign up or find more information, go to [email or web address] and click on the Sign Up Now link in the [employer] box.      Enter 2 ½ hours per week – spread out over time Use a andreea to keep you motivated   Don’t forget strength training with weights and resistance Set goals and track your progress   You don’t need to join a gym. Home exercises work great.  Put more priority on exe

## (undated) NOTE — MR AVS SNAPSHOT
[address]                Thank you for choosing us for your health care visit with [facility] Wound Nurse.   We are glad to serve you and happy to provide you with This list is accurate as of: 5/26/17  3:26 PM.  Always use your most recent med list.                ALPRAZolam 0.25 MG Tabs   Take 1 tablet (0.25 mg total) by mouth nightly as needed for Sleep.    Commonly known as:  XANAX           aspirin 325 MG Tabs   T

## (undated) NOTE — LETTER
[facility], [place]  Authorization for Invasive Procedures  1.  I hereby authorize Dr. Efrain Hayes , my physician and whomever may be designated as the doctor's assistant, to perform the following operation and/or procedure:  Left Lower performed for the purposes of advancing medicine, science, and/or education, provided my identity is not revealed. If the procedure has been videotaped, the physician/surgeon will obtain the original videotape.  The hospital will not be responsible for stor My signature below affirms that prior to the time of the procedure, I have explained to the patient and/or his legal representative, the risks and benefits involved in the proposed treatment and any reasonable alternative to the proposed treatment.  I have

## (undated) NOTE — MR AVS SNAPSHOT
[facility] SPECIALTY [facility] - [place] [address]  [address] [phone]               Thank you for choosing us for your health care visit with Rubia Estrada MD.  We are glad to serve you and happy to provide you with this summary of yo Medication Follow-Up           Medical Issues Discussed Today     Bilateral carotid artery stenosis    Heel ulcer    Panic attack as reaction to stress    Smoker      Instructions and Information about Your Health     None      Allergies as of May 27, 201 You can access your MyChart to more actively manage your health care and view more details from this visit by going to [email or web address].   If you've recently had a stay at the Hospital you can access your discharge instructions in [address] by hina

## (undated) NOTE — MR AVS SNAPSHOT
[address]                Thank you for choosing us for your health care visit with [facility] Wound Nurse.   We are glad to serve you and happy to provide you with This list is accurate as of: 6/14/17  2:37 PM.  Always use your most recent med list.                ALPRAZolam 0.5 MG Tabs   Take 1 tablet (0.5 mg total) by mouth nightly as needed for Sleep.    Commonly known as:  XANAX           aspirin [address]

## (undated) NOTE — Clinical Note
February 14, 2017         Ruddy Can MD  [address] [facility]      Patient: Taurus Tony   YOB: 1955   Date of Visit: 2/14/2017       Dear Dr. Yael Alejo MD,    I saw your patient, Taurus Tony, on 2/14/201

## (undated) NOTE — MR AVS SNAPSHOT
[address]  [address]  [phone]               Thank you for choosing us for your health care visit with Shaila Joseph DPM.  We are glad to serve you and happy to provide yo office. At that time, you will be provided with any authorization numbers or be assured that none are required. You can then schedule your appointment. Failure to obtain required authorization numbers can create reimbursement difficulties for you.     Assoc discharge instructions in "[email or web address]hart by going to Visits < Admission Summaries. If you've been to the Emergency Department or your doctor's office, you can view your past visit information in "[email or web address]hart by going to Visits < Visit Summaries. [employer] questions?

## (undated) NOTE — MR AVS SNAPSHOT
[address]                Thank you for choosing us for your health care visit with [facility] Wound Nurse.   We are glad to serve you and happy to provide you with This list is accurate as of: 6/21/17  2:11 PM.  Always use your most recent med list.                ALPRAZolam 0.5 MG Tabs   Take 1 tablet (0.5 mg total) by mouth nightly as needed for Sleep.    Commonly known as:  XANAX           aspirin [address]

## (undated) NOTE — MR AVS SNAPSHOT
[address] [place] [address]  [phone]  [phone]               Thank you for choosing us for your health care visit with SALVADOR Luke.   We are glad to serve you and happy to provide you wi Apply 1 Application topically 2 (two) times daily.    Commonly known as:  [address] can access your [employer] to more actively manage your health care and view more details from this visit by going to [email or web address]

## (undated) NOTE — MR AVS SNAPSHOT
[address] [facility]  [phone]               Thank you for choosing us for your health care visit with [address].   We are glad to serve you and happy to provide yo This list is accurate as of: 4/18/17  4:20 PM.  Always use your most recent med list.                Acetaminophen-Codeine #3 300-30 MG Tabs   May take one every 4 to 6 hours as needed for pain   Commonly known as:  TYLENOL #3           Atorvastatin Calciu

## (undated) NOTE — MR AVS SNAPSHOT
[address]  [phone]               Thank you for choosing us for your health care visit with dAitya Hagen DPM.  We are glad to serve you and happy to provide yo view more details from this visit by going to [email or web address]. If you've recently had a stay at the Hospital you can access your discharge instructions in [employer] by going to Visits < Admission Summaries.  If you've been to the Emergency Depar

## (undated) NOTE — MR AVS SNAPSHOT
[address]                Thank you for choosing us for your health care visit with [address] [facility] Wound Nurse.   We are glad to serve you and happy to provide you with This list is accurate as of: 5/3/17  4:11 PM.  Always use your most recent med list.                aspirin 325 MG Tabs   Take 325 mg by mouth daily. Atorvastatin Calcium 20 MG Tabs   Take 1 tablet (20 mg total) by mouth nightly.    Commonly known

## (undated) NOTE — MR AVS SNAPSHOT
[facility] SPECIALTY [facility] [place] [address] [place]  [id number] [phone]               Thank you for choosing us for your health care visit with Lea Estrada MD.  We are glad to serve you and happy to provide you with this summary of yo Return in about 3 months (around 7/29/2017) for Chronic problems.          Reason for Today's Visit     Blood Pressure     Test Results           Medical Issues Discussed Today     Bilateral carotid artery stenosis    HTN (hypertension)    Smoker    Ulcer brain and causes a stroke. Date Last Reviewed: 6/8/2015  © 4511-7864 The [address] [place] [address], [address] [place] [address]. All rights reserved. This information is not intended as a substitute for professional medical care.  Always follow y or a patch. The clamps are then removed. Next, the skin incision is sutured closed. A tube or drain may be put in place to keep fluids from collecting around the area.   The surgery usually takes around 2 hours, but may be longer depending on the anesthetic Commonly known as:  SILVADENE                Where to Get Your Medications      These medications were sent to [facility] [address] [place] [address] 33, [address] AT [address], [phone], [phone] [address]

## (undated) NOTE — MR AVS SNAPSHOT
[address] [facility]  [phone]               Thank you for choosing us for your health care visit with [address].   We are glad to serve you and happy to provide yo [address] [facility] ([address])    [address] [facility]   [phone]              Medical Issues Discussed Today     Ulcer of left heel [facility])      Instructions and Information about Your He For medical emergencies, dial 911.            Visit [facility] online at  [email or web address]